# Patient Record
Sex: FEMALE | Race: WHITE | NOT HISPANIC OR LATINO | Employment: FULL TIME | URBAN - METROPOLITAN AREA
[De-identification: names, ages, dates, MRNs, and addresses within clinical notes are randomized per-mention and may not be internally consistent; named-entity substitution may affect disease eponyms.]

---

## 2018-09-21 ENCOUNTER — TELEPHONE (OUTPATIENT)
Dept: URGENT CARE | Facility: CLINIC | Age: 18
End: 2018-09-21

## 2018-09-21 NOTE — TELEPHONE ENCOUNTER
Sent Fax to [facility], They faxed back an Appt for 10/22/18 @ 9 a.m. Scheduled by Birgit. Pt was called and given the Info. Took this referral out of the Cue

## 2018-10-26 ENCOUNTER — HOSPITAL ENCOUNTER (OUTPATIENT)
Dept: GENERAL RADIOLOGY | Facility: HOSPITAL | Age: 18
Discharge: HOME | End: 2018-10-26
Admitting: NURSE PRACTITIONER

## 2018-10-26 ENCOUNTER — TRANSCRIBE ORDERS (OUTPATIENT)
Dept: ADMINISTRATIVE | Facility: HOSPITAL | Age: 18
End: 2018-10-26

## 2018-10-26 DIAGNOSIS — R52 PAIN: ICD-10-CM

## 2018-10-26 DIAGNOSIS — M54.2 CERVICALGIA: Primary | ICD-10-CM

## 2018-10-26 PROCEDURE — 73030 X-RAY EXAM OF SHOULDER: CPT

## 2018-10-26 PROCEDURE — 72050 X-RAY EXAM NECK SPINE 4/5VWS: CPT

## 2019-01-16 ENCOUNTER — TRANSCRIBE ORDERS (OUTPATIENT)
Dept: ADMINISTRATIVE | Facility: HOSPITAL | Age: 19
End: 2019-01-16

## 2019-01-16 DIAGNOSIS — R51.9 SEVERE HEADACHE: Primary | ICD-10-CM

## 2019-01-16 DIAGNOSIS — M54.2 CERVICALGIA: Primary | ICD-10-CM

## 2019-01-17 ENCOUNTER — TRANSCRIBE ORDERS (OUTPATIENT)
Dept: ADMINISTRATIVE | Facility: HOSPITAL | Age: 19
End: 2019-01-17

## 2019-01-17 DIAGNOSIS — G89.29 CHRONIC LEFT SHOULDER PAIN: Primary | ICD-10-CM

## 2019-01-17 DIAGNOSIS — M25.512 CHRONIC LEFT SHOULDER PAIN: Primary | ICD-10-CM

## 2019-01-22 ENCOUNTER — HOSPITAL ENCOUNTER (OUTPATIENT)
Dept: MRI IMAGING | Facility: HOSPITAL | Age: 19
Discharge: HOME | End: 2019-01-22
Admitting: NURSE PRACTITIONER

## 2019-01-22 ENCOUNTER — HOSPITAL ENCOUNTER (OUTPATIENT)
Dept: MRI IMAGING | Facility: HOSPITAL | Age: 19
Discharge: HOME | End: 2019-01-22

## 2019-01-22 DIAGNOSIS — R51.9 SEVERE HEADACHE: ICD-10-CM

## 2019-01-22 DIAGNOSIS — M54.2 CERVICALGIA: ICD-10-CM

## 2019-01-22 PROCEDURE — 72141 MRI NECK SPINE W/O DYE: CPT

## 2019-01-22 PROCEDURE — 70553 MRI BRAIN STEM W/O & W/DYE: CPT

## 2019-01-22 PROCEDURE — 0 GADOBENATE DIMEGLUMINE 529 MG/ML SOLUTION: Performed by: NURSE PRACTITIONER

## 2019-01-22 PROCEDURE — A9577 INJ MULTIHANCE: HCPCS | Performed by: NURSE PRACTITIONER

## 2019-01-22 RX ADMIN — GADOBENATE DIMEGLUMINE 13 ML: 529 INJECTION, SOLUTION INTRAVENOUS at 14:04

## 2019-01-23 ENCOUNTER — HOSPITAL ENCOUNTER (OUTPATIENT)
Dept: MRI IMAGING | Facility: HOSPITAL | Age: 19
Discharge: HOME | End: 2019-01-23

## 2019-01-23 ENCOUNTER — HOSPITAL ENCOUNTER (OUTPATIENT)
Dept: GENERAL RADIOLOGY | Facility: HOSPITAL | Age: 19
Discharge: HOME | End: 2019-01-23
Admitting: NURSE PRACTITIONER

## 2019-01-23 DIAGNOSIS — G89.29 CHRONIC LEFT SHOULDER PAIN: ICD-10-CM

## 2019-01-23 DIAGNOSIS — M25.512 CHRONIC LEFT SHOULDER PAIN: ICD-10-CM

## 2019-01-23 PROCEDURE — 73222 MRI JOINT UPR EXTREM W/DYE: CPT

## 2019-01-23 PROCEDURE — 25010000002 IOPAMIDOL 61 % SOLUTION: Performed by: NURSE PRACTITIONER

## 2019-01-23 PROCEDURE — 0 GADOBENATE DIMEGLUMINE 529 MG/ML SOLUTION: Performed by: NURSE PRACTITIONER

## 2019-01-23 PROCEDURE — A9577 INJ MULTIHANCE: HCPCS | Performed by: NURSE PRACTITIONER

## 2019-01-23 PROCEDURE — 77002 NEEDLE LOCALIZATION BY XRAY: CPT

## 2019-01-23 RX ORDER — LIDOCAINE HYDROCHLORIDE 10 MG/ML
5 INJECTION, SOLUTION INFILTRATION; PERINEURAL ONCE
Status: COMPLETED | OUTPATIENT
Start: 2019-01-23 | End: 2019-01-23

## 2019-01-23 RX ADMIN — IOPAMIDOL 20 ML: 612 INJECTION, SOLUTION INTRAVENOUS at 14:56

## 2019-01-23 RX ADMIN — GADOBENATE DIMEGLUMINE: 529 INJECTION, SOLUTION INTRAVENOUS at 14:54

## 2019-01-23 RX ADMIN — LIDOCAINE HYDROCHLORIDE 5 ML: 10 INJECTION, SOLUTION INFILTRATION; PERINEURAL at 14:56

## 2021-04-08 ENCOUNTER — TELEPHONE (OUTPATIENT)
Dept: URGENT CARE | Facility: CLINIC | Age: 21
End: 2021-04-08

## 2021-06-22 ENCOUNTER — LAB (OUTPATIENT)
Dept: LAB | Facility: HOSPITAL | Age: 21
End: 2021-06-22

## 2021-06-22 ENCOUNTER — TRANSCRIBE ORDERS (OUTPATIENT)
Dept: LAB | Facility: HOSPITAL | Age: 21
End: 2021-06-22

## 2021-06-22 DIAGNOSIS — Z34.81 PRENATAL CARE, SUBSEQUENT PREGNANCY, FIRST TRIMESTER: ICD-10-CM

## 2021-06-22 DIAGNOSIS — Z3A.08 8 WEEKS GESTATION OF PREGNANCY: Primary | ICD-10-CM

## 2021-06-22 DIAGNOSIS — Z3A.08 8 WEEKS GESTATION OF PREGNANCY: ICD-10-CM

## 2021-06-22 LAB
ABO GROUP BLD: NORMAL
AMPHET+METHAMPHET UR QL: NEGATIVE
AMPHETAMINES UR QL: NEGATIVE
BARBITURATES UR QL SCN: NEGATIVE
BASOPHILS # BLD AUTO: 0.01 10*3/MM3 (ref 0–0.2)
BASOPHILS NFR BLD AUTO: 0.1 % (ref 0–1.5)
BENZODIAZ UR QL SCN: NEGATIVE
BLD GP AB SCN SERPL QL: NEGATIVE
BUPRENORPHINE SERPL-MCNC: NEGATIVE NG/ML
CANNABINOIDS SERPL QL: NEGATIVE
COCAINE UR QL: NEGATIVE
DEPRECATED RDW RBC AUTO: 47.8 FL (ref 37–54)
EOSINOPHIL # BLD AUTO: 0.06 10*3/MM3 (ref 0–0.4)
EOSINOPHIL NFR BLD AUTO: 0.5 % (ref 0.3–6.2)
ERYTHROCYTE [DISTWIDTH] IN BLOOD BY AUTOMATED COUNT: 15.5 % (ref 12.3–15.4)
GLUCOSE SERPL-MCNC: 81 MG/DL (ref 65–99)
HCT VFR BLD AUTO: 34.6 % (ref 34–46.6)
HGB BLD-MCNC: 11.7 G/DL (ref 12–15.9)
IMM GRANULOCYTES # BLD AUTO: 0.03 10*3/MM3 (ref 0–0.05)
IMM GRANULOCYTES NFR BLD AUTO: 0.3 % (ref 0–0.5)
LYMPHOCYTES # BLD AUTO: 2.89 10*3/MM3 (ref 0.7–3.1)
LYMPHOCYTES NFR BLD AUTO: 25 % (ref 19.6–45.3)
MCH RBC QN AUTO: 28.5 PG (ref 26.6–33)
MCHC RBC AUTO-ENTMCNC: 33.8 G/DL (ref 31.5–35.7)
MCV RBC AUTO: 84.2 FL (ref 79–97)
METHADONE UR QL SCN: NEGATIVE
MONOCYTES # BLD AUTO: 0.81 10*3/MM3 (ref 0.1–0.9)
MONOCYTES NFR BLD AUTO: 7 % (ref 5–12)
NEUTROPHILS NFR BLD AUTO: 67.1 % (ref 42.7–76)
NEUTROPHILS NFR BLD AUTO: 7.77 10*3/MM3 (ref 1.7–7)
NRBC BLD AUTO-RTO: 0 /100 WBC (ref 0–0.2)
OPIATES UR QL: NEGATIVE
OXYCODONE UR QL SCN: NEGATIVE
PCP UR QL SCN: NEGATIVE
PLATELET # BLD AUTO: 352 10*3/MM3 (ref 140–450)
PMV BLD AUTO: 11.2 FL (ref 6–12)
PROPOXYPH UR QL: NEGATIVE
RBC # BLD AUTO: 4.11 10*6/MM3 (ref 3.77–5.28)
RH BLD: POSITIVE
TRICYCLICS UR QL SCN: NEGATIVE
TSH SERPL DL<=0.05 MIU/L-ACNC: 0.74 UIU/ML (ref 0.27–4.2)
WBC # BLD AUTO: 11.57 10*3/MM3 (ref 3.4–10.8)

## 2021-06-22 PROCEDURE — 81003 URINALYSIS AUTO W/O SCOPE: CPT

## 2021-06-22 PROCEDURE — 84443 ASSAY THYROID STIM HORMONE: CPT

## 2021-06-22 PROCEDURE — 80081 OBSTETRIC PANEL INC HIV TSTG: CPT

## 2021-06-22 PROCEDURE — 82947 ASSAY GLUCOSE BLOOD QUANT: CPT

## 2021-06-22 PROCEDURE — 80306 DRUG TEST PRSMV INSTRMNT: CPT

## 2021-06-22 PROCEDURE — 86762 RUBELLA ANTIBODY: CPT

## 2021-06-22 PROCEDURE — 86901 BLOOD TYPING SEROLOGIC RH(D): CPT

## 2021-06-22 PROCEDURE — 36415 COLL VENOUS BLD VENIPUNCTURE: CPT

## 2021-06-22 PROCEDURE — 86900 BLOOD TYPING SEROLOGIC ABO: CPT

## 2021-06-22 PROCEDURE — 86803 HEPATITIS C AB TEST: CPT

## 2021-06-22 PROCEDURE — 86850 RBC ANTIBODY SCREEN: CPT

## 2021-06-23 LAB
BILIRUB UR QL STRIP: NEGATIVE
CLARITY UR: CLEAR
COLOR UR: YELLOW
GLUCOSE UR STRIP-MCNC: NEGATIVE MG/DL
HBV SURFACE AG SERPL QL IA: NORMAL
HCV AB SER DONR QL: NORMAL
HGB UR QL STRIP.AUTO: NEGATIVE
HIV1+2 AB SER QL: NORMAL
KETONES UR QL STRIP: NEGATIVE
LEUKOCYTE ESTERASE UR QL STRIP.AUTO: NEGATIVE
NITRITE UR QL STRIP: NEGATIVE
PH UR STRIP.AUTO: 8 [PH] (ref 5–8)
PROT UR QL STRIP: NEGATIVE
RPR SER QL: NORMAL
RUBV IGG SERPL IA-ACNC: 1.56 INDEX
SP GR UR STRIP: 1.01 (ref 1–1.03)
UROBILINOGEN UR QL STRIP: NORMAL

## 2021-10-13 ENCOUNTER — TRANSCRIBE ORDERS (OUTPATIENT)
Dept: LAB | Facility: HOSPITAL | Age: 21
End: 2021-10-13

## 2021-10-13 ENCOUNTER — LAB (OUTPATIENT)
Dept: LAB | Facility: HOSPITAL | Age: 21
End: 2021-10-13

## 2021-10-13 DIAGNOSIS — Z3A.28 28 WEEKS GESTATION OF PREGNANCY: ICD-10-CM

## 2021-10-13 DIAGNOSIS — Z34.83 PRENATAL CARE, SUBSEQUENT PREGNANCY, THIRD TRIMESTER: ICD-10-CM

## 2021-10-13 DIAGNOSIS — Z3A.28 28 WEEKS GESTATION OF PREGNANCY: Primary | ICD-10-CM

## 2021-10-13 LAB
BLD GP AB SCN SERPL QL: NEGATIVE
DEPRECATED RDW RBC AUTO: 39.7 FL (ref 37–54)
ERYTHROCYTE [DISTWIDTH] IN BLOOD BY AUTOMATED COUNT: 14.1 % (ref 12.3–15.4)
GLUCOSE 1H P 100 G GLC PO SERPL-MCNC: 173 MG/DL (ref 65–139)
HCT VFR BLD AUTO: 25.4 % (ref 34–46.6)
HGB BLD-MCNC: 8.4 G/DL (ref 12–15.9)
MCH RBC QN AUTO: 25.9 PG (ref 26.6–33)
MCHC RBC AUTO-ENTMCNC: 33.1 G/DL (ref 31.5–35.7)
MCV RBC AUTO: 78.4 FL (ref 79–97)
PLATELET # BLD AUTO: 274 10*3/MM3 (ref 140–450)
PMV BLD AUTO: 11.4 FL (ref 6–12)
RBC # BLD AUTO: 3.24 10*6/MM3 (ref 3.77–5.28)
WBC # BLD AUTO: 11.9 10*3/MM3 (ref 3.4–10.8)

## 2021-10-13 PROCEDURE — 85027 COMPLETE CBC AUTOMATED: CPT

## 2021-10-13 PROCEDURE — 86850 RBC ANTIBODY SCREEN: CPT

## 2021-10-13 PROCEDURE — 82950 GLUCOSE TEST: CPT

## 2021-10-13 PROCEDURE — 82962 GLUCOSE BLOOD TEST: CPT | Performed by: NURSE PRACTITIONER

## 2021-10-13 PROCEDURE — 36415 COLL VENOUS BLD VENIPUNCTURE: CPT

## 2021-10-21 ENCOUNTER — LAB (OUTPATIENT)
Dept: LAB | Facility: HOSPITAL | Age: 21
End: 2021-10-21

## 2021-10-21 ENCOUNTER — TRANSCRIBE ORDERS (OUTPATIENT)
Dept: LAB | Facility: HOSPITAL | Age: 21
End: 2021-10-21

## 2021-10-21 DIAGNOSIS — O99.810 ABNORMAL MATERNAL GLUCOSE TOLERANCE, ANTEPARTUM: Primary | ICD-10-CM

## 2021-10-21 LAB
GLUCOSE 1H P 100 G GLC PO SERPL-MCNC: 183 MG/DL (ref 74–180)
GLUCOSE 2H P 100 G GLC PO SERPL-MCNC: 186 MG/DL (ref 74–155)
GLUCOSE 3H P 100 G GLC PO SERPL-MCNC: 134 MG/DL (ref 74–140)
GLUCOSE P FAST SERPL-MCNC: 83 MG/DL (ref 74–106)

## 2021-10-21 PROCEDURE — 82951 GLUCOSE TOLERANCE TEST (GTT): CPT

## 2021-10-21 PROCEDURE — 82962 GLUCOSE BLOOD TEST: CPT | Performed by: NURSE PRACTITIONER

## 2021-10-21 PROCEDURE — 82952 GTT-ADDED SAMPLES: CPT

## 2021-10-21 PROCEDURE — 36415 COLL VENOUS BLD VENIPUNCTURE: CPT

## 2021-10-28 ENCOUNTER — TRANSCRIBE ORDERS (OUTPATIENT)
Dept: OBSTETRICS AND GYNECOLOGY | Facility: HOSPITAL | Age: 21
End: 2021-10-28

## 2021-10-28 DIAGNOSIS — O24.419 GESTATIONAL DIABETES MELLITUS (GDM) IN THIRD TRIMESTER, GESTATIONAL DIABETES METHOD OF CONTROL UNSPECIFIED: ICD-10-CM

## 2021-10-28 DIAGNOSIS — O30.043 TWIN PREGNANCY, DICHORIONIC/DIAMNIOTIC, THIRD TRIMESTER: Primary | ICD-10-CM

## 2021-11-10 ENCOUNTER — APPOINTMENT (OUTPATIENT)
Dept: WOMENS IMAGING | Facility: HOSPITAL | Age: 21
End: 2021-11-10

## 2021-11-11 ENCOUNTER — OFFICE VISIT (OUTPATIENT)
Dept: OBSTETRICS AND GYNECOLOGY | Facility: HOSPITAL | Age: 21
End: 2021-11-11

## 2021-11-11 ENCOUNTER — HOSPITAL ENCOUNTER (OUTPATIENT)
Dept: WOMENS IMAGING | Facility: HOSPITAL | Age: 21
Discharge: HOME | End: 2021-11-11
Admitting: NURSE PRACTITIONER

## 2021-11-11 VITALS — BODY MASS INDEX: 35.19 KG/M2 | WEIGHT: 205 LBS | DIASTOLIC BLOOD PRESSURE: 77 MMHG | SYSTOLIC BLOOD PRESSURE: 137 MMHG

## 2021-11-11 DIAGNOSIS — Z34.90 PREGNANCY, UNSPECIFIED GESTATIONAL AGE: ICD-10-CM

## 2021-11-11 DIAGNOSIS — O30.043 TWIN PREGNANCY, DICHORIONIC/DIAMNIOTIC, THIRD TRIMESTER: ICD-10-CM

## 2021-11-11 DIAGNOSIS — O24.419 GESTATIONAL DIABETES MELLITUS (GDM) IN THIRD TRIMESTER, GESTATIONAL DIABETES METHOD OF CONTROL UNSPECIFIED: ICD-10-CM

## 2021-11-11 DIAGNOSIS — O30.043 DICHORIONIC DIAMNIOTIC TWIN PREGNANCY IN THIRD TRIMESTER: Primary | ICD-10-CM

## 2021-11-11 PROCEDURE — 76812 OB US DETAILED ADDL FETUS: CPT | Performed by: OBSTETRICS & GYNECOLOGY

## 2021-11-11 PROCEDURE — 76812 OB US DETAILED ADDL FETUS: CPT

## 2021-11-11 PROCEDURE — 76811 OB US DETAILED SNGL FETUS: CPT

## 2021-11-11 PROCEDURE — 76811 OB US DETAILED SNGL FETUS: CPT | Performed by: OBSTETRICS & GYNECOLOGY

## 2021-11-11 RX ORDER — FERROUS SULFATE 325(65) MG
325 TABLET ORAL
COMMUNITY
End: 2021-12-05 | Stop reason: HOSPADM

## 2021-11-11 RX ORDER — PRENATAL WITH FERROUS FUM AND FOLIC ACID 3080; 920; 120; 400; 22; 1.84; 3; 20; 10; 1; 12; 200; 27; 25; 2 [IU]/1; [IU]/1; MG/1; [IU]/1; MG/1; MG/1; MG/1; MG/1; MG/1; MG/1; UG/1; MG/1; MG/1; MG/1; MG/1
1 TABLET ORAL DAILY
COMMUNITY
End: 2021-12-05 | Stop reason: HOSPADM

## 2021-11-11 NOTE — PROGRESS NOTES
Denies any complaints.  No genetic screenings done.  Fasting blood sugars 70-80, 2 hours after meals 110-130.  Reports blood sugars to [facility].

## 2021-11-15 ENCOUNTER — TRANSCRIBE ORDERS (OUTPATIENT)
Dept: LAB | Facility: HOSPITAL | Age: 21
End: 2021-11-15

## 2021-11-15 ENCOUNTER — LAB (OUTPATIENT)
Dept: LAB | Facility: HOSPITAL | Age: 21
End: 2021-11-15

## 2021-11-15 DIAGNOSIS — Z34.83 PRENATAL CARE, SUBSEQUENT PREGNANCY, THIRD TRIMESTER: ICD-10-CM

## 2021-11-15 DIAGNOSIS — Z3A.33 33 WEEKS GESTATION OF PREGNANCY: Primary | ICD-10-CM

## 2021-11-15 DIAGNOSIS — Z3A.33 33 WEEKS GESTATION OF PREGNANCY: ICD-10-CM

## 2021-11-15 LAB
ALP SERPL-CCNC: 211 U/L (ref 39–117)
ALT SERPL W P-5'-P-CCNC: 11 U/L (ref 1–33)
AST SERPL-CCNC: 18 U/L (ref 1–32)
BILIRUB SERPL-MCNC: <0.2 MG/DL (ref 0–1.2)
CREAT SERPL-MCNC: 0.67 MG/DL (ref 0.57–1)
DEPRECATED RDW RBC AUTO: 44.3 FL (ref 37–54)
ERYTHROCYTE [DISTWIDTH] IN BLOOD BY AUTOMATED COUNT: 16.1 % (ref 12.3–15.4)
HCT VFR BLD AUTO: 27 % (ref 34–46.6)
HGB BLD-MCNC: 8.6 G/DL (ref 12–15.9)
LDH SERPL-CCNC: 183 U/L (ref 135–214)
MCH RBC QN AUTO: 24.7 PG (ref 26.6–33)
MCHC RBC AUTO-ENTMCNC: 31.9 G/DL (ref 31.5–35.7)
MCV RBC AUTO: 77.6 FL (ref 79–97)
PLATELET # BLD AUTO: 182 10*3/MM3 (ref 140–450)
PMV BLD AUTO: 12.6 FL (ref 6–12)
RBC # BLD AUTO: 3.48 10*6/MM3 (ref 3.77–5.28)
URATE SERPL-MCNC: 5.9 MG/DL (ref 2.4–5.7)
WBC # BLD AUTO: 9.68 10*3/MM3 (ref 3.4–10.8)

## 2021-11-15 PROCEDURE — 82570 ASSAY OF URINE CREATININE: CPT

## 2021-11-15 PROCEDURE — 84450 TRANSFERASE (AST) (SGOT): CPT

## 2021-11-15 PROCEDURE — 82247 BILIRUBIN TOTAL: CPT

## 2021-11-15 PROCEDURE — 85027 COMPLETE CBC AUTOMATED: CPT

## 2021-11-15 PROCEDURE — 83615 LACTATE (LD) (LDH) ENZYME: CPT

## 2021-11-15 PROCEDURE — 84550 ASSAY OF BLOOD/URIC ACID: CPT

## 2021-11-15 PROCEDURE — 84460 ALANINE AMINO (ALT) (SGPT): CPT

## 2021-11-15 PROCEDURE — 82565 ASSAY OF CREATININE: CPT

## 2021-11-15 PROCEDURE — 36415 COLL VENOUS BLD VENIPUNCTURE: CPT

## 2021-11-15 PROCEDURE — 84075 ASSAY ALKALINE PHOSPHATASE: CPT

## 2021-11-16 LAB — CREAT UR-MCNC: 67.8 MG/DL

## 2021-12-02 ENCOUNTER — ANESTHESIA (OUTPATIENT)
Dept: LABOR AND DELIVERY | Facility: HOSPITAL | Age: 21
End: 2021-12-02

## 2021-12-02 ENCOUNTER — ANESTHESIA EVENT (OUTPATIENT)
Dept: LABOR AND DELIVERY | Facility: HOSPITAL | Age: 21
End: 2021-12-02

## 2021-12-02 ENCOUNTER — HOSPITAL ENCOUNTER (INPATIENT)
Facility: HOSPITAL | Age: 21
LOS: 3 days | Discharge: HOME | End: 2021-12-05
Attending: OBSTETRICS & GYNECOLOGY | Admitting: OBSTETRICS & GYNECOLOGY

## 2021-12-02 PROBLEM — O30.049 DICHORIONIC DIAMNIOTIC TWIN PREGNANCY: Status: ACTIVE | Noted: 2021-12-02

## 2021-12-02 PROBLEM — O30.049 TWIN PREGNANCY, TWINS DICHORIONIC AND DIAMNIOTIC: Status: ACTIVE | Noted: 2021-12-02

## 2021-12-02 LAB
ABO GROUP BLD: NORMAL
ABO GROUP BLD: NORMAL
ALP SERPL-CCNC: 228 U/L (ref 39–117)
ALT SERPL W P-5'-P-CCNC: 9 U/L (ref 1–33)
AST SERPL-CCNC: 25 U/L (ref 1–32)
BILIRUB SERPL-MCNC: <0.2 MG/DL (ref 0–1.2)
BILIRUB UR QL STRIP: NEGATIVE
BLD GP AB SCN SERPL QL: NEGATIVE
CLARITY UR: CLEAR
COLOR UR: YELLOW
CREAT SERPL-MCNC: 0.76 MG/DL (ref 0.57–1)
CREAT UR-MCNC: 28.4 MG/DL
DEPRECATED RDW RBC AUTO: 46.1 FL (ref 37–54)
ERYTHROCYTE [DISTWIDTH] IN BLOOD BY AUTOMATED COUNT: 17.2 % (ref 12.3–15.4)
GLUCOSE UR STRIP-MCNC: NEGATIVE MG/DL
HCT VFR BLD AUTO: 26.7 % (ref 34–46.6)
HGB BLD-MCNC: 8.5 G/DL (ref 12–15.9)
HGB UR QL STRIP.AUTO: NEGATIVE
KETONES UR QL STRIP: NEGATIVE
LDH SERPL-CCNC: 248 U/L (ref 135–214)
LEUKOCYTE ESTERASE UR QL STRIP.AUTO: NEGATIVE
MCH RBC QN AUTO: 23.8 PG (ref 26.6–33)
MCHC RBC AUTO-ENTMCNC: 31.8 G/DL (ref 31.5–35.7)
MCV RBC AUTO: 74.8 FL (ref 79–97)
NITRITE UR QL STRIP: NEGATIVE
PH UR STRIP.AUTO: 7 [PH] (ref 5–8)
PLATELET # BLD AUTO: 200 10*3/MM3 (ref 140–450)
PMV BLD AUTO: 12.1 FL (ref 6–12)
PROT ?TM UR-MCNC: 9.5 MG/DL
PROT UR QL STRIP: NEGATIVE
RBC # BLD AUTO: 3.57 10*6/MM3 (ref 3.77–5.28)
RH BLD: POSITIVE
RH BLD: POSITIVE
SP GR UR STRIP: 1.01 (ref 1–1.03)
T&S EXPIRATION DATE: NORMAL
URATE SERPL-MCNC: 7.4 MG/DL (ref 2.4–5.7)
UROBILINOGEN UR QL STRIP: NORMAL
WBC NRBC COR # BLD: 9.5 10*3/MM3 (ref 3.4–10.8)

## 2021-12-02 PROCEDURE — 25010000002 ONDANSETRON PER 1 MG: Performed by: ANESTHESIOLOGY

## 2021-12-02 PROCEDURE — 87086 URINE CULTURE/COLONY COUNT: CPT | Performed by: OBSTETRICS & GYNECOLOGY

## 2021-12-02 PROCEDURE — 81003 URINALYSIS AUTO W/O SCOPE: CPT | Performed by: OBSTETRICS & GYNECOLOGY

## 2021-12-02 PROCEDURE — 84156 ASSAY OF PROTEIN URINE: CPT | Performed by: OBSTETRICS & GYNECOLOGY

## 2021-12-02 PROCEDURE — 84460 ALANINE AMINO (ALT) (SGPT): CPT | Performed by: OBSTETRICS & GYNECOLOGY

## 2021-12-02 PROCEDURE — 59025 FETAL NON-STRESS TEST: CPT

## 2021-12-02 PROCEDURE — 25010000002 MORPHINE PER 10 MG: Performed by: ANESTHESIOLOGY

## 2021-12-02 PROCEDURE — 84075 ASSAY ALKALINE PHOSPHATASE: CPT | Performed by: OBSTETRICS & GYNECOLOGY

## 2021-12-02 PROCEDURE — 86901 BLOOD TYPING SEROLOGIC RH(D): CPT | Performed by: OBSTETRICS & GYNECOLOGY

## 2021-12-02 PROCEDURE — 25010000002 KETOROLAC TROMETHAMINE PER 15 MG: Performed by: OBSTETRICS & GYNECOLOGY

## 2021-12-02 PROCEDURE — 86900 BLOOD TYPING SEROLOGIC ABO: CPT

## 2021-12-02 PROCEDURE — 86850 RBC ANTIBODY SCREEN: CPT | Performed by: OBSTETRICS & GYNECOLOGY

## 2021-12-02 PROCEDURE — 84450 TRANSFERASE (AST) (SGOT): CPT | Performed by: OBSTETRICS & GYNECOLOGY

## 2021-12-02 PROCEDURE — 86923 COMPATIBILITY TEST ELECTRIC: CPT

## 2021-12-02 PROCEDURE — 25010000002 TERBUTALINE PER 1 MG

## 2021-12-02 PROCEDURE — 25010000002 NALBUPHINE PER 10 MG: Performed by: ANESTHESIOLOGY

## 2021-12-02 PROCEDURE — 0 CEFAZOLIN IN DEXTROSE 2-4 GM/100ML-% SOLUTION: Performed by: OBSTETRICS & GYNECOLOGY

## 2021-12-02 PROCEDURE — 25010000002 ONDANSETRON PER 1 MG: Performed by: OBSTETRICS & GYNECOLOGY

## 2021-12-02 PROCEDURE — 82247 BILIRUBIN TOTAL: CPT | Performed by: OBSTETRICS & GYNECOLOGY

## 2021-12-02 PROCEDURE — 25010000002 FENTANYL CITRATE (PF) 50 MCG/ML SOLUTION: Performed by: ANESTHESIOLOGY

## 2021-12-02 PROCEDURE — 86901 BLOOD TYPING SEROLOGIC RH(D): CPT

## 2021-12-02 PROCEDURE — 85027 COMPLETE CBC AUTOMATED: CPT | Performed by: OBSTETRICS & GYNECOLOGY

## 2021-12-02 PROCEDURE — 82570 ASSAY OF URINE CREATININE: CPT | Performed by: OBSTETRICS & GYNECOLOGY

## 2021-12-02 PROCEDURE — 83615 LACTATE (LD) (LDH) ENZYME: CPT | Performed by: OBSTETRICS & GYNECOLOGY

## 2021-12-02 PROCEDURE — 82565 ASSAY OF CREATININE: CPT | Performed by: OBSTETRICS & GYNECOLOGY

## 2021-12-02 PROCEDURE — 84550 ASSAY OF BLOOD/URIC ACID: CPT | Performed by: OBSTETRICS & GYNECOLOGY

## 2021-12-02 PROCEDURE — 25010000002 METOCLOPRAMIDE PER 10 MG: Performed by: ANESTHESIOLOGY

## 2021-12-02 PROCEDURE — 86900 BLOOD TYPING SEROLOGIC ABO: CPT | Performed by: OBSTETRICS & GYNECOLOGY

## 2021-12-02 RX ORDER — ACETAMINOPHEN 500 MG
1000 TABLET ORAL ONCE
Status: COMPLETED | OUTPATIENT
Start: 2021-12-02 | End: 2021-12-02

## 2021-12-02 RX ORDER — ONDANSETRON 2 MG/ML
4 INJECTION INTRAMUSCULAR; INTRAVENOUS EVERY 6 HOURS PRN
Status: DISCONTINUED | OUTPATIENT
Start: 2021-12-02 | End: 2021-12-02 | Stop reason: HOSPADM

## 2021-12-02 RX ORDER — BUPIVACAINE HYDROCHLORIDE 7.5 MG/ML
INJECTION, SOLUTION INTRASPINAL AS NEEDED
Status: DISCONTINUED | OUTPATIENT
Start: 2021-12-02 | End: 2021-12-02 | Stop reason: SURG

## 2021-12-02 RX ORDER — ONDANSETRON 4 MG/1
4 TABLET, FILM COATED ORAL EVERY 6 HOURS PRN
Status: DISCONTINUED | OUTPATIENT
Start: 2021-12-02 | End: 2021-12-02 | Stop reason: HOSPADM

## 2021-12-02 RX ORDER — SODIUM CHLORIDE 0.9 % (FLUSH) 0.9 %
10 SYRINGE (ML) INJECTION AS NEEDED
Status: DISCONTINUED | OUTPATIENT
Start: 2021-12-02 | End: 2021-12-02 | Stop reason: HOSPADM

## 2021-12-02 RX ORDER — OXYTOCIN-SODIUM CHLORIDE 0.9% IV SOLN 30 UNIT/500ML 30-0.9/5 UT/ML-%
650 SOLUTION INTRAVENOUS ONCE
Status: DISCONTINUED | OUTPATIENT
Start: 2021-12-02 | End: 2021-12-02 | Stop reason: HOSPADM

## 2021-12-02 RX ORDER — MORPHINE SULFATE 1 MG/ML
INJECTION, SOLUTION EPIDURAL; INTRATHECAL; INTRAVENOUS AS NEEDED
Status: DISCONTINUED | OUTPATIENT
Start: 2021-12-02 | End: 2021-12-02 | Stop reason: SURG

## 2021-12-02 RX ORDER — ONDANSETRON 2 MG/ML
INJECTION INTRAMUSCULAR; INTRAVENOUS AS NEEDED
Status: DISCONTINUED | OUTPATIENT
Start: 2021-12-02 | End: 2021-12-02 | Stop reason: SURG

## 2021-12-02 RX ORDER — METOCLOPRAMIDE HYDROCHLORIDE 5 MG/ML
INJECTION INTRAMUSCULAR; INTRAVENOUS AS NEEDED
Status: DISCONTINUED | OUTPATIENT
Start: 2021-12-02 | End: 2021-12-02 | Stop reason: SURG

## 2021-12-02 RX ORDER — SODIUM CHLORIDE, SODIUM LACTATE, POTASSIUM CHLORIDE, CALCIUM CHLORIDE 600; 310; 30; 20 MG/100ML; MG/100ML; MG/100ML; MG/100ML
125 INJECTION, SOLUTION INTRAVENOUS CONTINUOUS
Status: DISCONTINUED | OUTPATIENT
Start: 2021-12-02 | End: 2021-12-03

## 2021-12-02 RX ORDER — TRISODIUM CITRATE DIHYDRATE AND CITRIC ACID MONOHYDRATE 500; 334 MG/5ML; MG/5ML
30 SOLUTION ORAL ONCE
Status: COMPLETED | OUTPATIENT
Start: 2021-12-02 | End: 2021-12-02

## 2021-12-02 RX ORDER — KETOROLAC TROMETHAMINE 30 MG/ML
30 INJECTION, SOLUTION INTRAMUSCULAR; INTRAVENOUS ONCE
Status: COMPLETED | OUTPATIENT
Start: 2021-12-02 | End: 2021-12-02

## 2021-12-02 RX ORDER — TERBUTALINE SULFATE 1 MG/ML
INJECTION, SOLUTION SUBCUTANEOUS
Status: COMPLETED
Start: 2021-12-02 | End: 2021-12-02

## 2021-12-02 RX ORDER — TERBUTALINE SULFATE 1 MG/ML
0.25 INJECTION, SOLUTION SUBCUTANEOUS ONCE
Status: COMPLETED | OUTPATIENT
Start: 2021-12-02 | End: 2021-12-02

## 2021-12-02 RX ORDER — OXYTOCIN 10 [USP'U]/ML
INJECTION, SOLUTION INTRAMUSCULAR; INTRAVENOUS AS NEEDED
Status: DISCONTINUED | OUTPATIENT
Start: 2021-12-02 | End: 2021-12-02 | Stop reason: SURG

## 2021-12-02 RX ORDER — SODIUM CHLORIDE 0.9 % (FLUSH) 0.9 %
10 SYRINGE (ML) INJECTION EVERY 12 HOURS SCHEDULED
Status: DISCONTINUED | OUTPATIENT
Start: 2021-12-02 | End: 2021-12-02 | Stop reason: HOSPADM

## 2021-12-02 RX ORDER — FAMOTIDINE 10 MG/ML
INJECTION, SOLUTION INTRAVENOUS AS NEEDED
Status: DISCONTINUED | OUTPATIENT
Start: 2021-12-02 | End: 2021-12-02 | Stop reason: SURG

## 2021-12-02 RX ORDER — SODIUM CHLORIDE, SODIUM LACTATE, POTASSIUM CHLORIDE, CALCIUM CHLORIDE 600; 310; 30; 20 MG/100ML; MG/100ML; MG/100ML; MG/100ML
INJECTION, SOLUTION INTRAVENOUS CONTINUOUS PRN
Status: DISCONTINUED | OUTPATIENT
Start: 2021-12-02 | End: 2021-12-02 | Stop reason: SURG

## 2021-12-02 RX ORDER — LIDOCAINE HYDROCHLORIDE 10 MG/ML
5 INJECTION, SOLUTION EPIDURAL; INFILTRATION; INTRACAUDAL; PERINEURAL AS NEEDED
Status: DISCONTINUED | OUTPATIENT
Start: 2021-12-02 | End: 2021-12-02 | Stop reason: HOSPADM

## 2021-12-02 RX ORDER — OXYTOCIN-SODIUM CHLORIDE 0.9% IV SOLN 30 UNIT/500ML 30-0.9/5 UT/ML-%
85 SOLUTION INTRAVENOUS ONCE
Status: COMPLETED | OUTPATIENT
Start: 2021-12-02 | End: 2021-12-02

## 2021-12-02 RX ORDER — FENTANYL CITRATE 50 UG/ML
INJECTION, SOLUTION INTRAMUSCULAR; INTRAVENOUS AS NEEDED
Status: DISCONTINUED | OUTPATIENT
Start: 2021-12-02 | End: 2021-12-02 | Stop reason: SURG

## 2021-12-02 RX ORDER — CEFAZOLIN SODIUM 2 G/100ML
2 INJECTION, SOLUTION INTRAVENOUS ONCE
Status: COMPLETED | OUTPATIENT
Start: 2021-12-02 | End: 2021-12-02

## 2021-12-02 RX ORDER — SODIUM CHLORIDE, SODIUM LACTATE, POTASSIUM CHLORIDE, CALCIUM CHLORIDE 600; 310; 30; 20 MG/100ML; MG/100ML; MG/100ML; MG/100ML
125 INJECTION, SOLUTION INTRAVENOUS CONTINUOUS
Status: DISCONTINUED | OUTPATIENT
Start: 2021-12-02 | End: 2021-12-02 | Stop reason: SDUPTHER

## 2021-12-02 RX ORDER — OXYTOCIN-SODIUM CHLORIDE 0.9% IV SOLN 30 UNIT/500ML 30-0.9/5 UT/ML-%
SOLUTION INTRAVENOUS AS NEEDED
Status: DISCONTINUED | OUTPATIENT
Start: 2021-12-02 | End: 2021-12-02 | Stop reason: SURG

## 2021-12-02 RX ADMIN — ONDANSETRON 4 MG: 2 INJECTION INTRAMUSCULAR; INTRAVENOUS at 22:53

## 2021-12-02 RX ADMIN — SODIUM CHLORIDE, POTASSIUM CHLORIDE, SODIUM LACTATE AND CALCIUM CHLORIDE 1000 ML: 600; 310; 30; 20 INJECTION, SOLUTION INTRAVENOUS at 17:40

## 2021-12-02 RX ADMIN — OXYTOCIN 3 UNITS: 10 INJECTION, SOLUTION INTRAMUSCULAR; INTRAVENOUS at 21:40

## 2021-12-02 RX ADMIN — TERBUTALINE SULFATE 0.25 MG: 1 INJECTION, SOLUTION SUBCUTANEOUS at 18:08

## 2021-12-02 RX ADMIN — MORPHINE SULFATE 0.15 MG: 1 INJECTION, SOLUTION EPIDURAL; INTRATHECAL; INTRAVENOUS at 21:22

## 2021-12-02 RX ADMIN — OXYTOCIN-SODIUM CHLORIDE 0.9% IV SOLN 30 UNIT/500ML 500 ML: 30-0.9/5 SOLUTION at 21:40

## 2021-12-02 RX ADMIN — BUPIVACAINE HYDROCHLORIDE IN DEXTROSE 1.6 ML: 7.5 INJECTION, SOLUTION SUBARACHNOID at 21:22

## 2021-12-02 RX ADMIN — FAMOTIDINE 20 MG: 10 INJECTION, SOLUTION INTRAVENOUS at 21:15

## 2021-12-02 RX ADMIN — Medication 85 ML/HR: at 22:00

## 2021-12-02 RX ADMIN — SODIUM CITRATE AND CITRIC ACID MONOHYDRATE 30 ML: 500; 334 SOLUTION ORAL at 20:56

## 2021-12-02 RX ADMIN — METOCLOPRAMIDE 10 MG: 5 INJECTION, SOLUTION INTRAMUSCULAR; INTRAVENOUS at 21:15

## 2021-12-02 RX ADMIN — ACETAMINOPHEN 1000 MG: 500 TABLET, FILM COATED ORAL at 20:56

## 2021-12-02 RX ADMIN — FENTANYL CITRATE 20 MCG: 50 INJECTION, SOLUTION INTRAMUSCULAR; INTRAVENOUS at 21:22

## 2021-12-02 RX ADMIN — NALBUPHINE HYDROCHLORIDE 3 MG: 10 INJECTION, SOLUTION INTRAMUSCULAR; INTRAVENOUS; SUBCUTANEOUS at 22:45

## 2021-12-02 RX ADMIN — ONDANSETRON 4 MG: 2 INJECTION INTRAMUSCULAR; INTRAVENOUS at 21:15

## 2021-12-02 RX ADMIN — OXYTOCIN 10 UNITS: 10 INJECTION, SOLUTION INTRAMUSCULAR; INTRAVENOUS at 21:42

## 2021-12-02 RX ADMIN — OXYTOCIN 3 UNITS: 10 INJECTION, SOLUTION INTRAMUSCULAR; INTRAVENOUS at 21:45

## 2021-12-02 RX ADMIN — CEFAZOLIN SODIUM 2 G: 2 INJECTION, SOLUTION INTRAVENOUS at 20:57

## 2021-12-02 RX ADMIN — OXYTOCIN 4 UNITS: 10 INJECTION, SOLUTION INTRAMUSCULAR; INTRAVENOUS at 21:37

## 2021-12-02 RX ADMIN — SODIUM CHLORIDE, POTASSIUM CHLORIDE, SODIUM LACTATE AND CALCIUM CHLORIDE: 600; 310; 30; 20 INJECTION, SOLUTION INTRAVENOUS at 21:13

## 2021-12-02 RX ADMIN — KETOROLAC TROMETHAMINE 30 MG: 30 INJECTION, SOLUTION INTRAMUSCULAR at 22:53

## 2021-12-02 NOTE — H&P
[place]  Obstetric History and Physical    Chief Complaint   Patient presents with   • Elevated Blood Pressure       Subjective     Patient is a 21 y.o. female  currently at 35w4d, who presented to the OB office today with complains of HA, edema, nausea, and general malaise.  Her symptoms have been present for one day.  BPs in the office were noted to be elevated at 140/92.      Her prenatal care is complicated by Di/Di twin gestation, gestational diabetes, and anemia. She is scheduled for  at 37 weeks.  Her previous obstetric/gynecological history is noted for is non-contributory.    The following portions of the patients history were reviewed and updated as appropriate: current medications, allergies, past medical history, past surgical history, past family history, past social history and problem list .       Prenatal Information:  Prenatal Results     POC Urine Glucose/Protein     Test Value Reference Range Date Time    Urine Glucose        Urine Protein              Initial Prenatal Labs     Test Value Reference Range Date Time    Hemoglobin  8.6 g/dL 12.0 - 15.9 11/15/21 1620       8.4 g/dL 12.0 - 15.9 10/13/21 1032       11.7 g/dL 12.0 - 15.9 21 1510    Hematocrit  27.0 % 34.0 - 46.6 11/15/21 1620       25.4 % 34.0 - 46.6 10/13/21 1032       34.6 % 34.0 - 46.6 21 1510    Platelets  182 10*3/mm3 140 - 450 11/15/21 1620       274 10*3/mm3 140 - 450 10/13/21 1032       352 10*3/mm3 140 - 450 21 1510    Rubella IgG  1.56 index Immune >0.99 21 1510    Hepatitis B SAg  Non-Reactive  Non-Reactive 21 1510    Hepatitis C Ab  Non-Reactive  Non-Reactive 21 1510    RPR  Non-Reactive  Non-Reactive 21 1510    ABO  O   21 1510    Rh  Positive   21 1510    Antibody Screen  Negative   10/13/21 1032       Negative   21 1510    HIV  Non-Reactive  Non-Reactive 21 1510    Urine Culture        Gonorrhea        Chlamydia        TSH  0.738 uIU/mL  0.270 - 4.200 06/22/21 1510    HgB A1c               2nd and 3rd Trimester     Test Value Reference Range Date Time    Hemoglobin (repeated)  8.6 g/dL 12.0 - 15.9 11/15/21 1620       8.4 g/dL 12.0 - 15.9 10/13/21 1032    Hematocrit (repeated)  27.0 % 34.0 - 46.6 11/15/21 1620       25.4 % 34.0 - 46.6 10/13/21 1032    Platelets   182 10*3/mm3 140 - 450 11/15/21 1620       274 10*3/mm3 140 - 450 10/13/21 1032       352 10*3/mm3 140 - 450 06/22/21 1510    GCT  183 mg/dL 74 - 180 10/21/21 1518       173 mg/dL 65 - 139 10/13/21 1032    Antibody Screen (repeated)  Negative   10/13/21 1032    GTT Fasting        GTT 1 Hr        GTT 2 Hr        GTT 3 Hr  134 mg/dL 74 - 140 10/21/21 1711    Group B Strep              Drug Screening     Test Value Reference Range Date Time    Amphetamine Screen  Negative  Negative 06/22/21 1510    Barbiturate Screen  Negative  Negative 06/22/21 1510    Benzodiazepine Screen  Negative  Negative 06/22/21 1510    Methadone Screen  Negative  Negative 06/22/21 1510    Phencyclidine Screen  Negative  Negative 06/22/21 1510    Opiates Screen  Negative  Negative 06/22/21 1510    THC Screen  Negative  Negative 06/22/21 1510    Cocaine Screen  Negative  Negative 06/22/21 1510    Propoxyphene Screen  Negative  Negative 06/22/21 1510    Buprenorphine Screen  Negative  Negative 06/22/21 1510    Methamphetamine Screen  Negative  Negative 06/22/21 1510    Oxycodone Screen  Negative  Negative 06/22/21 1510    Tricyclic Antidepressants Screen  Negative  Negative 06/22/21 1510          Other (Risk screening)     Test Value Reference Range Date Time    Varicella IgG        Parvovirus IgG        CMV IgG        Cystic Fibrosis        Hemoglobin electrophoresis        NIPT        MSAFP-4        AFP (for NTD only)              Legend    ^: Historical                      External Prenatal Results     Pregnancy Outside Results - Transcribed From Office Records - See Scanned Records For Details     Test Value Date  Time    ABO  O  21 1510    Rh  Positive  21 1510    Antibody Screen  Negative  10/13/21 1032       Negative  21 1510    Varicella IgG       Rubella  1.56 index 21 1510    Hgb  8.6 g/dL 11/15/21 1620       8.4 g/dL 10/13/21 1032       11.7 g/dL 21 1510    Hct  27.0 % 11/15/21 1620       25.4 % 10/13/21 1032       34.6 % 21 1510    Glucose Fasting GTT       Glucose Tolerance Test 1 hour       Glucose Tolerance Test 3 hour  134 mg/dL 10/21/21 1711    Gonorrhea (discrete)       Chlamydia (discrete)       RPR  Non-Reactive  21 1510    VDRL       Syphilis Antibody       HBsAg  Non-Reactive  21 1510    Herpes Simplex Virus PCR       Herpes Simplex VIrus Culture       HIV  Non-Reactive  21 1510    Hep C RNA Quant PCR       Hep C Antibody  Non-Reactive  21 1510    AFP       Group B Strep       GBS Susceptibility to Clindamycin       GBS Susceptibility to Erythromycin       Fetal Fibronectin       Genetic Testing, Maternal Blood             Drug Screening     Test Value Date Time    Urine Drug Screen       Amphetamine Screen  Negative  21 1510    Barbiturate Screen  Negative  21 1510    Benzodiazepine Screen  Negative  21 1510    Methadone Screen  Negative  21 1510    Phencyclidine Screen  Negative  21 1510    Opiates Screen  Negative  21 1510    THC Screen  Negative  21 1510    Cocaine Screen       Propoxyphene Screen  Negative  21 1510    Buprenorphine Screen  Negative  21 1510    Methamphetamine Screen       Oxycodone Screen  Negative  21 1510    Tricyclic Antidepressants Screen  Negative  21 1510          Legend    ^: Historical                         Past OB History:     OB History    Para Term  AB Living   1 0 0 0 0 0   SAB IAB Ectopic Molar Multiple Live Births   0 0 0 0 0 0      # Outcome Date GA Lbr Isaiah/2nd Weight Sex Delivery Anes PTL Lv   1 Current                Past Medical  History: Past Medical History:   Diagnosis Date   • ADHD    • Anxiety    • Asthma    • Depression    • Diabetes mellitus (HCC) 2021    gestational   • Migraine    • Personal history of COVID-19 09/2021      Past Surgical History Past Surgical History:   Procedure Laterality Date   • TONSILLECTOMY     • WISDOM TOOTH EXTRACTION        Family History: No family history on file.   Social History:  reports that she has never smoked. She has never used smokeless tobacco.   reports previous alcohol use.   reports no history of drug use.        Review of Systems   Constitutional: Negative.    Eyes: Negative.    Respiratory: Negative.    Cardiovascular: Negative.    Gastrointestinal: Negative.    Endocrine: Negative.    Genitourinary: Negative.    Musculoskeletal: Negative.    Skin: Negative.    Neurological: Positive for headaches.   Hematological: Negative.    Psychiatric/Behavioral: Negative.          Objective     Vital Signs Range for the last 24 hours  Temperature:     Temp Source:     BP: BP: (144)/(90) 144/90   Pulse: Heart Rate:  [98] 98   Respirations:     SPO2:     O2 Amount (l/min):     O2 Devices     Weight:       Physical Examination: General appearance - alert, well appearing, and in no distress  Neck - supple, no significant adenopathy  Chest - clear to auscultation, no wheezes, rales or rhonchi, symmetric air entry  Heart - normal rate, regular rhythm, normal S1, S2, no murmurs, rubs, clicks or gallops  Abdomen - soft, nontender, nondistended, no masses or organomegaly  Extremities - pedal edema 3 +    Presentation:    Cervix: Exam by:     Dilation:     Effacement:     Station:       Fetal Heart Rate Assessment   Method:     Beats/min:     Baseline:     Variability:     Accels:     Decels:     Tracing Category:       Uterine Assessment   Method:     Frequency (min):     Ctx Count in 10 min:     Duration:     Intensity:     Intensity by IUPC:     Resting Tone:     Resting Tone by IUPC:     [place] Units:          Assessment/Plan         Assessment & Plan    Assessment:  1.  Intrauterine pregnancy at 35w4d gestation with reactive fetal status.    2.  Elevated blood pressure with headache  3. Di/Di Twin gestation  4. Gestational diabetes  5. Edema   6. Anemia     Plan:  1. Admit to APU for observation, labs, serial BPs  2. Plan of care has been reviewed with patient and she verbalizes understanding  3.  Risks, benefits of treatment plan have been discussed.  4.  All questions have been answered.        Shira Ricci CNM  12/2/2021  14:30 EST

## 2021-12-03 LAB
BASOPHILS # BLD AUTO: 0.03 10*3/MM3 (ref 0–0.2)
BASOPHILS NFR BLD AUTO: 0.2 % (ref 0–1.5)
DEPRECATED RDW RBC AUTO: 49 FL (ref 37–54)
EOSINOPHIL # BLD AUTO: 0.02 10*3/MM3 (ref 0–0.4)
EOSINOPHIL NFR BLD AUTO: 0.1 % (ref 0.3–6.2)
ERYTHROCYTE [DISTWIDTH] IN BLOOD BY AUTOMATED COUNT: 17.7 % (ref 12.3–15.4)
HCT VFR BLD AUTO: 26 % (ref 34–46.6)
HGB BLD-MCNC: 8.2 G/DL (ref 12–15.9)
IMM GRANULOCYTES # BLD AUTO: 0.11 10*3/MM3 (ref 0–0.05)
IMM GRANULOCYTES NFR BLD AUTO: 0.8 % (ref 0–0.5)
LYMPHOCYTES # BLD AUTO: 2.95 10*3/MM3 (ref 0.7–3.1)
LYMPHOCYTES NFR BLD AUTO: 21.4 % (ref 19.6–45.3)
MCH RBC QN AUTO: 24.3 PG (ref 26.6–33)
MCHC RBC AUTO-ENTMCNC: 31.5 G/DL (ref 31.5–35.7)
MCV RBC AUTO: 77.2 FL (ref 79–97)
MONOCYTES # BLD AUTO: 1.07 10*3/MM3 (ref 0.1–0.9)
MONOCYTES NFR BLD AUTO: 7.8 % (ref 5–12)
NEUTROPHILS NFR BLD AUTO: 69.7 % (ref 42.7–76)
NEUTROPHILS NFR BLD AUTO: 9.59 10*3/MM3 (ref 1.7–7)
NRBC BLD AUTO-RTO: 0.1 /100 WBC (ref 0–0.2)
PLATELET # BLD AUTO: 175 10*3/MM3 (ref 140–450)
PMV BLD AUTO: 12.2 FL (ref 6–12)
RBC # BLD AUTO: 3.37 10*6/MM3 (ref 3.77–5.28)
WBC NRBC COR # BLD: 13.77 10*3/MM3 (ref 3.4–10.8)

## 2021-12-03 PROCEDURE — 63710000001 ONDANSETRON PER 8 MG: Performed by: OBSTETRICS & GYNECOLOGY

## 2021-12-03 PROCEDURE — 85025 COMPLETE CBC W/AUTO DIFF WBC: CPT | Performed by: NURSE PRACTITIONER

## 2021-12-03 PROCEDURE — 25010000002 KETOROLAC TROMETHAMINE PER 15 MG: Performed by: OBSTETRICS & GYNECOLOGY

## 2021-12-03 PROCEDURE — 25010000002 PROMETHAZINE PER 50 MG: Performed by: OBSTETRICS & GYNECOLOGY

## 2021-12-03 RX ORDER — DOCUSATE SODIUM 100 MG/1
100 CAPSULE, LIQUID FILLED ORAL 2 TIMES DAILY
Status: DISCONTINUED | OUTPATIENT
Start: 2021-12-03 | End: 2021-12-05 | Stop reason: HOSPADM

## 2021-12-03 RX ORDER — PROMETHAZINE HYDROCHLORIDE 12.5 MG/1
12.5 SUPPOSITORY RECTAL EVERY 6 HOURS PRN
Status: DISCONTINUED | OUTPATIENT
Start: 2021-12-03 | End: 2021-12-05 | Stop reason: HOSPADM

## 2021-12-03 RX ORDER — FERROUS SULFATE 325(65) MG
325 TABLET ORAL 2 TIMES DAILY WITH MEALS
Status: DISCONTINUED | OUTPATIENT
Start: 2021-12-03 | End: 2021-12-05 | Stop reason: HOSPADM

## 2021-12-03 RX ORDER — SIMETHICONE 80 MG
80 TABLET,CHEWABLE ORAL 4 TIMES DAILY PRN
Status: DISCONTINUED | OUTPATIENT
Start: 2021-12-03 | End: 2021-12-03

## 2021-12-03 RX ORDER — DOCUSATE SODIUM 100 MG/1
100 CAPSULE, LIQUID FILLED ORAL 2 TIMES DAILY PRN
Status: DISCONTINUED | OUTPATIENT
Start: 2021-12-03 | End: 2021-12-03

## 2021-12-03 RX ORDER — HYDROMORPHONE HYDROCHLORIDE 1 MG/ML
0.5 INJECTION, SOLUTION INTRAMUSCULAR; INTRAVENOUS; SUBCUTANEOUS
Status: ACTIVE | OUTPATIENT
Start: 2021-12-03 | End: 2021-12-04

## 2021-12-03 RX ORDER — HYDROCORTISONE 25 MG/G
1 CREAM TOPICAL AS NEEDED
Status: DISCONTINUED | OUTPATIENT
Start: 2021-12-03 | End: 2021-12-05 | Stop reason: HOSPADM

## 2021-12-03 RX ORDER — SIMETHICONE 80 MG
80 TABLET,CHEWABLE ORAL
Status: DISCONTINUED | OUTPATIENT
Start: 2021-12-03 | End: 2021-12-05 | Stop reason: HOSPADM

## 2021-12-03 RX ORDER — CALCIUM CARBONATE 200(500)MG
1 TABLET,CHEWABLE ORAL EVERY 4 HOURS PRN
Status: DISCONTINUED | OUTPATIENT
Start: 2021-12-03 | End: 2021-12-05 | Stop reason: HOSPADM

## 2021-12-03 RX ORDER — LANOLIN
CREAM (ML) TOPICAL
Status: DISCONTINUED | OUTPATIENT
Start: 2021-12-03 | End: 2021-12-05 | Stop reason: HOSPADM

## 2021-12-03 RX ORDER — NALBUPHINE HCL 10 MG/ML
AMPUL (ML) INJECTION AS NEEDED
Status: DISCONTINUED | OUTPATIENT
Start: 2021-12-02 | End: 2021-12-03 | Stop reason: SURG

## 2021-12-03 RX ORDER — PRENATAL VIT/IRON FUM/FOLIC AC 27MG-0.8MG
1 TABLET ORAL DAILY
Status: DISCONTINUED | OUTPATIENT
Start: 2021-12-03 | End: 2021-12-05 | Stop reason: HOSPADM

## 2021-12-03 RX ORDER — KETOROLAC TROMETHAMINE 30 MG/ML
15 INJECTION, SOLUTION INTRAMUSCULAR; INTRAVENOUS EVERY 6 HOURS
Status: COMPLETED | OUTPATIENT
Start: 2021-12-03 | End: 2021-12-03

## 2021-12-03 RX ORDER — SODIUM CHLORIDE 0.9 % (FLUSH) 0.9 %
3-10 SYRINGE (ML) INJECTION AS NEEDED
Status: DISCONTINUED | OUTPATIENT
Start: 2021-12-03 | End: 2021-12-05 | Stop reason: HOSPADM

## 2021-12-03 RX ORDER — PROMETHAZINE HYDROCHLORIDE 25 MG/1
25 TABLET ORAL EVERY 6 HOURS PRN
Status: DISCONTINUED | OUTPATIENT
Start: 2021-12-03 | End: 2021-12-05 | Stop reason: HOSPADM

## 2021-12-03 RX ORDER — ACETAMINOPHEN 325 MG/1
650 TABLET ORAL EVERY 6 HOURS
Status: DISCONTINUED | OUTPATIENT
Start: 2021-12-04 | End: 2021-12-05 | Stop reason: HOSPADM

## 2021-12-03 RX ORDER — NALBUPHINE HCL 10 MG/ML
3 AMPUL (ML) INJECTION EVERY 4 HOURS PRN
Status: ACTIVE | OUTPATIENT
Start: 2021-12-03 | End: 2021-12-04

## 2021-12-03 RX ORDER — OXYCODONE HYDROCHLORIDE 5 MG/1
5 TABLET ORAL EVERY 4 HOURS PRN
Status: DISCONTINUED | OUTPATIENT
Start: 2021-12-03 | End: 2021-12-05 | Stop reason: HOSPADM

## 2021-12-03 RX ORDER — OXYCODONE HYDROCHLORIDE 5 MG/1
10 TABLET ORAL EVERY 4 HOURS PRN
Status: DISCONTINUED | OUTPATIENT
Start: 2021-12-03 | End: 2021-12-05 | Stop reason: HOSPADM

## 2021-12-03 RX ORDER — IBUPROFEN 600 MG/1
600 TABLET ORAL EVERY 6 HOURS
Status: DISCONTINUED | OUTPATIENT
Start: 2021-12-04 | End: 2021-12-05 | Stop reason: HOSPADM

## 2021-12-03 RX ORDER — NALBUPHINE HCL 10 MG/ML
5 AMPUL (ML) INJECTION EVERY 4 HOURS PRN
Status: ACTIVE | OUTPATIENT
Start: 2021-12-03 | End: 2021-12-04

## 2021-12-03 RX ORDER — SODIUM CHLORIDE 0.9 % (FLUSH) 0.9 %
3 SYRINGE (ML) INJECTION EVERY 12 HOURS SCHEDULED
Status: DISCONTINUED | OUTPATIENT
Start: 2021-12-03 | End: 2021-12-05 | Stop reason: HOSPADM

## 2021-12-03 RX ORDER — ALUMINA, MAGNESIA, AND SIMETHICONE 2400; 2400; 240 MG/30ML; MG/30ML; MG/30ML
15 SUSPENSION ORAL EVERY 4 HOURS PRN
Status: DISCONTINUED | OUTPATIENT
Start: 2021-12-03 | End: 2021-12-05 | Stop reason: HOSPADM

## 2021-12-03 RX ORDER — ACETAMINOPHEN 500 MG
1000 TABLET ORAL EVERY 6 HOURS
Status: DISPENSED | OUTPATIENT
Start: 2021-12-03 | End: 2021-12-04

## 2021-12-03 RX ORDER — ONDANSETRON 4 MG/1
4 TABLET, FILM COATED ORAL EVERY 8 HOURS PRN
Status: DISCONTINUED | OUTPATIENT
Start: 2021-12-03 | End: 2021-12-05 | Stop reason: HOSPADM

## 2021-12-03 RX ADMIN — ACETAMINOPHEN 1000 MG: 500 TABLET, FILM COATED ORAL at 14:28

## 2021-12-03 RX ADMIN — KETOROLAC TROMETHAMINE 15 MG: 30 INJECTION, SOLUTION INTRAMUSCULAR at 23:19

## 2021-12-03 RX ADMIN — DOCUSATE SODIUM 100 MG: 100 CAPSULE, LIQUID FILLED ORAL at 21:51

## 2021-12-03 RX ADMIN — OXYCODONE 10 MG: 5 TABLET ORAL at 14:39

## 2021-12-03 RX ADMIN — SODIUM CHLORIDE, POTASSIUM CHLORIDE, SODIUM LACTATE AND CALCIUM CHLORIDE 1000 ML: 600; 310; 30; 20 INJECTION, SOLUTION INTRAVENOUS at 09:00

## 2021-12-03 RX ADMIN — KETOROLAC TROMETHAMINE 15 MG: 30 INJECTION, SOLUTION INTRAMUSCULAR at 11:34

## 2021-12-03 RX ADMIN — SIMETHICONE 80 MG: 80 TABLET, CHEWABLE ORAL at 21:51

## 2021-12-03 RX ADMIN — FERROUS SULFATE TAB 325 MG (65 MG ELEMENTAL FE) 325 MG: 325 (65 FE) TAB at 21:51

## 2021-12-03 RX ADMIN — OXYCODONE 5 MG: 5 TABLET ORAL at 23:19

## 2021-12-03 RX ADMIN — KETOROLAC TROMETHAMINE 15 MG: 30 INJECTION, SOLUTION INTRAMUSCULAR at 17:25

## 2021-12-03 RX ADMIN — KETOROLAC TROMETHAMINE 15 MG: 30 INJECTION, SOLUTION INTRAMUSCULAR at 06:39

## 2021-12-03 RX ADMIN — PROMETHAZINE HYDROCHLORIDE 12.5 MG: 25 INJECTION INTRAMUSCULAR; INTRAVENOUS at 01:45

## 2021-12-03 RX ADMIN — ACETAMINOPHEN 1000 MG: 500 TABLET, FILM COATED ORAL at 21:51

## 2021-12-03 RX ADMIN — ONDANSETRON HYDROCHLORIDE 4 MG: 4 TABLET, FILM COATED ORAL at 08:46

## 2021-12-03 RX ADMIN — OXYCODONE 10 MG: 5 TABLET ORAL at 18:33

## 2021-12-03 RX ADMIN — ACETAMINOPHEN 1000 MG: 500 TABLET, FILM COATED ORAL at 02:28

## 2021-12-03 RX ADMIN — OXYCODONE 5 MG: 5 TABLET ORAL at 02:28

## 2021-12-03 NOTE — OP NOTE
Section Procedure Note    Shavonne Pimentel    2021     Pre-operative Diagnosis: 1. IUP at 35w4d   2. Di-di twin pregnancy    3.  labor    4. Gestational hypertension    5. Fetus A in breech presentation      Post-operative Diagnosis: same    Findings: normal anatomy.  Infant A in silvestre breech presentation.  Infant B in vertex presentation.  True knot in both umbilical cords.  Infant B with nuchal x 1.     Estimated Blood Loss:  500           Drains: [place]                 Specimens: None               Complications:  None; patient tolerated the procedure well.           Disposition: PACU - hemodynamically stable.           Condition: stable    Surgeon: Linda Leos MD     Assistants: Dr. Johnson  was responsible for performing the following activities: Retraction, Suction, Irrigation and Delivery of Fetus and their skilled assistance was necessary for the success of this case.    Anesthesia: Spinal anesthesia    ASA Class: 2    Procedure Details   The patient was seen in the Holding Room. The risks, benefits, complications, treatment options, and expected outcomes were discussed with the patient.  The patient concurred with the proposed plan, giving informed consent.  The site of surgery was properly noted. The patient was taken to the Operating Room, identified as Shavonne Pimentel and the procedure verified as  Delivery. A Time Out was held and the above information confirmed.    After induction of anesthesia, the patient was draped and prepped in the usual sterile manner. A Pfannenstiel incision was made and carried down through the subcutaneous tissue to the fascia. A fascial incision was made and extended transversely. The fascia was  from the underlying rectus tissue superiorly and inferiorly. The peritoneum was identified and entered. The peritoneal incision was extended longitudinally.  A bladder flap was turned down.  A low transverse uterine  incision was made. Delivered from the silvestre breech presentation using the standard breech extraction maneuvers was a female infant and delivered from the vertex presentation was a female infant with birth weights and apgars of      Lynn Pimentel [[id number]]   No birth weight on file.      Lynn Pimentel [[id number]]   2832 g (6 lb 3.9 oz)          Lynn Pimentel [[id number]]           APGARS  One minute Five minutes Ten minutes Fifteen minutes Twenty minutes   Skin color: 0   1             Heart rate: 2   2             Grimace: 2   2              Muscle tone: 2   2              Breathin   2              Totals: 8   9                   Lynn Pimentel [[id number]]           APGARS  One minute Five minutes Ten minutes Fifteen minutes Twenty minutes   Skin color: 0   1             Heart rate: 2   2             Grimace: 2   2              Muscle tone: 2   2              Breathin   2              Totals: 8   9              . After the umbilical cord was clamped and cut, cord blood was obtained for evaluation. The placenta was removed intact and appeared normal. The uterine outline, tubes and ovaries appeared normal. The uterine incision was closed with running locked sutures of 1 chromic. 10 units of pitocin injected IM into the cornua.  Hemostasis was observed.  The fascia was then reapproximated with running sutures of 0 vicryl. The subcutaneous tissue was reapproximated with 3-0 plain. The skin was reapproximated with insorb subcuticular staples and reinforced with skin glue.      Instrument, sponge, and needle counts were correct prior the abdominal closure and at the conclusion of the case.         Linda Leos MD  21:59 EST  2021

## 2021-12-03 NOTE — LACTATION NOTE
12/03/21 0750   Maternal Information   Date of Referral 12/03/21   Person Making Referral other (see comments)  (courtesy)   Maternal Reason for Referral multiple birth; no prior breastfeeding experience  (mom feeling sick and has not put babies to breast yet.  Initiated pumping and encouraged to pump after nursing when attempting.)   Maternal Assessment   Breast Size Issue none   Breast Shape Bilateral:; round   Breast Density Bilateral:; soft   Nipples Bilateral:; short   Left Nipple Symptoms intact   Right Nipple Symptoms intact   Maternal Infant Feeding   Maternal Emotional State relaxed; receptive   Milk Expression/Equipment   Breast Pump Type double electric, hospital grade  (lansinoh pump at home)   Breast Pump Flange Type hard   Breast Pump Flange Size 24 mm   Equipment for Home Use breast pump borrowed   Breast Pumping   Breast Pumping Interventions post-feed pumping encouraged; frequent pumping encouraged  (q 3 pumping when not nursing, and post nursing pumping when putting babies to breast)   Breast Pumping double electric breast pump utilized

## 2021-12-03 NOTE — LACTATION NOTE
12/03/21 1345   Maternal Information   Date of Referral 12/03/21   Person Making Referral patient; nurse   Maternal Reason for Referral breastfeeding currently; no prior breastfeeding experience; multiple birth  (Mom ready to nurse babies for the first time.)   Infant Reason for Referral 35-37 weeks gestation   Maternal Assessment   Breast Size Issue none   Breast Shape Bilateral:; round   Breast Density Bilateral:; soft   Nipples Bilateral:; short   Left Nipple Symptoms intact   Right Nipple Symptoms intact   Maternal Infant Feeding   Maternal Emotional State relaxed; receptive   Infant Positioning clutch/football  (got mom tandem with assistance from lactation and FOB)   Signs of Milk Transfer audible swallow; deep jaw excursions noted; other (see comments)  (both infants clicking at breast for entire feeding)   Pain with Feeding no   Comfort Measures Before/During Feeding infant position adjusted; latch adjusted; maternal position adjusted; suction broken using finger   Nipple Shape After Feeding, Left Breast round; symmetrical; appropriately projected   Nipple Shape After Feeding, Right round; symmetrical; appropriately projected   Latch Assistance full assistance needed   Support Person Involvement actively supporting mother; verbally supports mother   Milk Expression/Equipment   Breast Pump Type double electric, hospital grade   Breast Pumping   Breast Pumping Interventions post-feed pumping encouraged

## 2021-12-03 NOTE — PROGRESS NOTES
Patient has made cervical change since admission.  PCS for delivery due to malpresentation of Fetus A.  Surgical risks reviewed.  Ancef 2 g preop.  SCDs.

## 2021-12-03 NOTE — ANESTHESIA POSTPROCEDURE EVALUATION
Patient: Shavonne Pimentel    Procedure Summary     Date: 21 Room / Location: [facility] LABOR DELIVERY   [facility] LABOR DELIVERY    Anesthesia Start:  Anesthesia Stop:     Procedure:  SECTION PRIMARY DI-DI TWINS (N/A Abdomen) Diagnosis:     Surgeons: Linda Leos MD Provider: Demian Downs DO    Anesthesia Type: spinal, ITN ASA Status: 2 - Emergent          Anesthesia Type: spinal, ITN    Vitals  Vitals Value Taken Time   /90 21 0608   Temp 98.4 °F (36.9 °C) 21 0608   Pulse 94 21 0608   Resp 16 21 0608   SpO2 94 % 21 2321   Vitals shown include unvalidated device data.        Post Anesthesia Care and Evaluation    Patient location during evaluation: bedside  Patient participation: complete - patient participated  Level of consciousness: awake and awake and alert  Pain score: 0  Pain management: satisfactory to patient  Airway patency: patent  Anesthetic complications: No anesthetic complications  PONV Status: none  Cardiovascular status: acceptable, hemodynamically stable and stable  Respiratory status: acceptable  Hydration status: stable  Post Neuraxial Block status: Motor and sensory function returned to baseline and No signs or symptoms of PDPH

## 2021-12-03 NOTE — ANESTHESIA PROCEDURE NOTES
Spinal Block      Patient reassessed immediately prior to procedure    Patient location during procedure: OB  Performed By  Anesthesiologist: Demian Downs DO  Preanesthetic Checklist  Completed: patient identified, IV checked, site marked, risks and benefits discussed, surgical consent, monitors and equipment checked, pre-op evaluation and timeout performed  Spinal Block Prep:  Patient Position:sitting  Sterile Tech:cap, gloves, mask and sterile barriers  Prep:Chloraprep  Patient Monitoring:blood pressure monitoring, continuous pulse oximetry and EKG  Spinal Block Procedure  Approach:midline  Guidance:landmark technique and palpation technique  Location:L3-L4  Needle Type:Elo  Needle Gauge:25 G  Placement of Spinal needle event:cerebrospinal fluid aspirated  Paresthesia: no  Fluid Appearance:clear     Post Assessment  Patient Tolerance:patient tolerated the procedure well with no apparent complications  Complications no

## 2021-12-03 NOTE — PROGRESS NOTES
YANET Wren   PROGRESS NOTE      Subjective     Patient reports:   Doing well, pain controlled with medication, ambulating around the room, priscilla po. Asymptomatic from anemia.     Objective      Vitals: Vital Signs Range for the last 24 hours  Temperature: Temp:  [97.7 °F (36.5 °C)-98.5 °F (36.9 °C)] 98.4 °F (36.9 °C)   Temp Source: Temp src: Oral   BP: BP: (102-158)/(41-96) 127/90   Pulse: Heart Rate:  [] 94   Respirations: Resp:  [16-20] 16   SPO2: SpO2:  [94 %-98 %] 96 %   O2 Amount (l/min):     O2 Devices Device (Oxygen Therapy): room air          Physical Exam    Lungs clear to auscultation bilaterally   Abdomen Soft, non-tender, normal bowel sounds; no bruits, organomegaly or masses.   Incision  healing well, no drainage, no erythema, no hernia, no seroma, no swelling, well approximated   Extremities extremities normal, atraumatic, no cyanosis or edema     LABS:  Lab Results   Component Value Date    WBC 9.50 2021    HGB 8.5 (L) 2021    HCT 26.7 (L) 2021    MCV 74.8 (L) 2021     2021       Assessment/Plan        Twin pregnancy, twins dichorionic and diamniotic    Dichorionic diamniotic twin pregnancy      Assessment:    Shavonne Pimentel is Day 1  post-partum    anemia    Plan:  remove urine catheter, saline lock IV fluids, advance diet  normal diet as tolerated and continue post op care.  Awaiting post operative CBC.  Continue PO iron      Shira Ricci CNM  12/3/2021  08:08 EST

## 2021-12-03 NOTE — ANESTHESIA PREPROCEDURE EVALUATION
Anesthesia Evaluation     Patient summary reviewed and Nursing notes reviewed   NPO Solid Status: > 6 hours  NPO Liquid Status: > 2 hours           Airway   Mallampati: II  TM distance: >3 FB  Neck ROM: full  No difficulty expected  Dental      Pulmonary    (+) asthma,  Cardiovascular - negative cardio ROS        Neuro/Psych  (+) headaches, psychiatric history Anxiety, Depression and ADHD,     GI/Hepatic/Renal/Endo    (+)   diabetes mellitus gestational well controlled,     Musculoskeletal (-) negative ROS    Abdominal    Substance History - negative use     OB/GYN    (+) Pregnant,     Comment: Di-Di Twin gestation.  Urgent  for ANTONIO with fetal  Malposition, fetus A in breech presentation.      Other                      Anesthesia Plan    ASA 2 - emergent     spinal and ITN       Anesthetic plan, all risks, benefits, and alternatives have been provided, discussed and informed consent has been obtained with: patient.  Use of blood products discussed with patient .

## 2021-12-03 NOTE — ANESTHESIA POSTPROCEDURE EVALUATION
Patient: Shavonne Pimentel    Procedure Summary     Date: 21 Room / Location: [facility] LABOR DELIVERY   [facility] LABOR DELIVERY    Anesthesia Start:  Anesthesia Stop:     Procedure:  SECTION PRIMARY DI-DI TWINS (N/A Abdomen) Diagnosis:     Surgeons: Linda Leos MD Provider: Demian Downs DO    Anesthesia Type: Not recorded ASA Status: Not recorded          Anesthesia Type: No value filed.    Vitals  Vitals Value Taken Time   /41 215   Temp 98.5 °F (36.9 °C) 21   Pulse 112 21   Resp 18 21   SpO2 97 % 21   Vitals shown include unvalidated device data.        Post Anesthesia Care and Evaluation    Patient location during evaluation: bedside  Patient participation: complete - patient participated  Level of consciousness: awake  Pain score: 0  Pain management: satisfactory to patient  Airway patency: patent  Anesthetic complications: No anesthetic complications  PONV Status: none  Cardiovascular status: acceptable and hemodynamically stable  Respiratory status: acceptable  Hydration status: acceptable

## 2021-12-03 NOTE — PLAN OF CARE
Goal Outcome Evaluation:  Plan of Care Reviewed With: patient, significant other        Progress: improving  Outcome Summary: Mom has been nauseated and not feeling well so babies were bottle fed in nsy all night.  Currently still nauseated.  Instructed mom on pumping with hospital pump q 3 hours and attempting at breast as desired/following with pumping.  Mom has lansinoh pump at home.  Breastfeeding and pumping education done, information given.

## 2021-12-04 LAB
BACTERIA SPEC AEROBE CULT: NO GROWTH
BASOPHILS # BLD AUTO: 0.03 10*3/MM3 (ref 0–0.2)
BASOPHILS NFR BLD AUTO: 0.2 % (ref 0–1.5)
DEPRECATED RDW RBC AUTO: 48.6 FL (ref 37–54)
EOSINOPHIL # BLD AUTO: 0.04 10*3/MM3 (ref 0–0.4)
EOSINOPHIL NFR BLD AUTO: 0.3 % (ref 0.3–6.2)
ERYTHROCYTE [DISTWIDTH] IN BLOOD BY AUTOMATED COUNT: 17.7 % (ref 12.3–15.4)
HCT VFR BLD AUTO: 25.2 % (ref 34–46.6)
HGB BLD-MCNC: 8.1 G/DL (ref 12–15.9)
IMM GRANULOCYTES # BLD AUTO: 0.3 10*3/MM3 (ref 0–0.05)
IMM GRANULOCYTES NFR BLD AUTO: 2.3 % (ref 0–0.5)
LYMPHOCYTES # BLD AUTO: 2.53 10*3/MM3 (ref 0.7–3.1)
LYMPHOCYTES NFR BLD AUTO: 19.1 % (ref 19.6–45.3)
MCH RBC QN AUTO: 24.4 PG (ref 26.6–33)
MCHC RBC AUTO-ENTMCNC: 32.1 G/DL (ref 31.5–35.7)
MCV RBC AUTO: 75.9 FL (ref 79–97)
MONOCYTES # BLD AUTO: 1.02 10*3/MM3 (ref 0.1–0.9)
MONOCYTES NFR BLD AUTO: 7.7 % (ref 5–12)
NEUTROPHILS NFR BLD AUTO: 70.4 % (ref 42.7–76)
NEUTROPHILS NFR BLD AUTO: 9.31 10*3/MM3 (ref 1.7–7)
NRBC BLD AUTO-RTO: 0.3 /100 WBC (ref 0–0.2)
PLATELET # BLD AUTO: 200 10*3/MM3 (ref 140–450)
PMV BLD AUTO: 11.8 FL (ref 6–12)
RBC # BLD AUTO: 3.32 10*6/MM3 (ref 3.77–5.28)
WBC NRBC COR # BLD: 13.23 10*3/MM3 (ref 3.4–10.8)

## 2021-12-04 PROCEDURE — 85025 COMPLETE CBC W/AUTO DIFF WBC: CPT | Performed by: OBSTETRICS & GYNECOLOGY

## 2021-12-04 RX ADMIN — DOCUSATE SODIUM 100 MG: 100 CAPSULE, LIQUID FILLED ORAL at 21:35

## 2021-12-04 RX ADMIN — OXYCODONE 5 MG: 5 TABLET ORAL at 21:35

## 2021-12-04 RX ADMIN — ACETAMINOPHEN 650 MG: 325 TABLET, FILM COATED ORAL at 08:55

## 2021-12-04 RX ADMIN — Medication 3 ML: at 08:56

## 2021-12-04 RX ADMIN — IBUPROFEN 600 MG: 600 TABLET ORAL at 06:37

## 2021-12-04 RX ADMIN — OXYCODONE 5 MG: 5 TABLET ORAL at 04:39

## 2021-12-04 RX ADMIN — SIMETHICONE 80 MG: 80 TABLET, CHEWABLE ORAL at 12:23

## 2021-12-04 RX ADMIN — SIMETHICONE 80 MG: 80 TABLET, CHEWABLE ORAL at 21:35

## 2021-12-04 RX ADMIN — FERROUS SULFATE TAB 325 MG (65 MG ELEMENTAL FE) 325 MG: 325 (65 FE) TAB at 18:29

## 2021-12-04 RX ADMIN — FERROUS SULFATE TAB 325 MG (65 MG ELEMENTAL FE) 325 MG: 325 (65 FE) TAB at 08:56

## 2021-12-04 RX ADMIN — SERTRALINE HYDROCHLORIDE 50 MG: 50 TABLET ORAL at 12:23

## 2021-12-04 RX ADMIN — SIMETHICONE 80 MG: 80 TABLET, CHEWABLE ORAL at 08:56

## 2021-12-04 RX ADMIN — PRENATAL VITAMINS-IRON FUMARATE 27 MG IRON-FOLIC ACID 0.8 MG TABLET 1 TABLET: at 08:56

## 2021-12-04 RX ADMIN — ACETAMINOPHEN 650 MG: 325 TABLET, FILM COATED ORAL at 04:39

## 2021-12-04 RX ADMIN — IBUPROFEN 600 MG: 600 TABLET ORAL at 18:29

## 2021-12-04 RX ADMIN — ACETAMINOPHEN 650 MG: 325 TABLET, FILM COATED ORAL at 15:17

## 2021-12-04 RX ADMIN — SIMETHICONE 80 MG: 80 TABLET, CHEWABLE ORAL at 18:29

## 2021-12-04 RX ADMIN — IBUPROFEN 600 MG: 600 TABLET ORAL at 12:23

## 2021-12-04 RX ADMIN — DOCUSATE SODIUM 100 MG: 100 CAPSULE, LIQUID FILLED ORAL at 08:56

## 2021-12-04 RX ADMIN — ACETAMINOPHEN 650 MG: 325 TABLET, FILM COATED ORAL at 21:35

## 2021-12-04 NOTE — PROGRESS NOTES
YANET Wren   PROGRESS NOTE      Subjective     Patient reports:   Doing well, pain controlled with medication, ambulating around the room, priscilla po   Denies GONZALEZ, vision changes and epigastric pain.     Objective      Vitals: Vital Signs Range for the last 24 hours  Temperature: Temp:  [97.8 °F (36.6 °C)-98.6 °F (37 °C)] 98.6 °F (37 °C)   Temp Source: Temp src: Oral   BP: BP: (113-145)/(61-91) 122/61   Pulse: Heart Rate:  [76-98] 98   Respirations: Resp:  [16-18] 18   SPO2:     O2 Amount (l/min):     O2 Devices            Physical Exam    Lungs clear to auscultation bilaterally   Abdomen Soft, non-tender, normal bowel sounds; no bruits, organomegaly or masses.   Incision  healing well, no drainage, no erythema, no hernia, no seroma, no swelling, well approximated   Extremities edema 2+ pitting edema in feet and ankles, 1+ pretibial edema     LABS:  Lab Results   Component Value Date    WBC 13.77 (H) 2021    HGB 8.2 (L) 2021    HCT 26.0 (L) 2021    MCV 77.2 (L) 2021     2021       Assessment/Plan        Twin pregnancy, twins dichorionic and diamniotic    Dichorionic diamniotic twin pregnancy  Mildly elevated blood pressure    Assessment:    Shavonne Pimentel is Day 2  post-partum  Awaiting CBC, lab unable to collect this am      Plan:  continue post op care.        Shira Ricci CNM  2021  09:22 EST

## 2021-12-04 NOTE — LACTATION NOTE
Mom is continuing to breast feed, pump, and supplement twins with Neosure.  She appears to be exhausted.  She was advised that she could pump and bottle feed only to have a break, especially at night.  She was provided literature on breastfeeding twins.  She was encouraged to call for assistance, if needed.

## 2021-12-05 VITALS
DIASTOLIC BLOOD PRESSURE: 78 MMHG | RESPIRATION RATE: 16 BRPM | SYSTOLIC BLOOD PRESSURE: 131 MMHG | HEIGHT: 65 IN | OXYGEN SATURATION: 98 % | HEART RATE: 82 BPM | BODY MASS INDEX: 36.82 KG/M2 | WEIGHT: 221 LBS | TEMPERATURE: 98.5 F

## 2021-12-05 LAB — TROPONIN T SERPL-MCNC: <0.01 NG/ML (ref 0–0.03)

## 2021-12-05 PROCEDURE — 84484 ASSAY OF TROPONIN QUANT: CPT | Performed by: OBSTETRICS & GYNECOLOGY

## 2021-12-05 RX ORDER — HYDROCODONE BITARTRATE AND ACETAMINOPHEN 5; 325 MG/1; MG/1
2 TABLET ORAL EVERY 6 HOURS PRN
Qty: 20 TABLET | Refills: 0 | Status: SHIPPED | OUTPATIENT
Start: 2021-12-05

## 2021-12-05 RX ADMIN — IBUPROFEN 600 MG: 600 TABLET ORAL at 00:32

## 2021-12-05 RX ADMIN — OXYCODONE 5 MG: 5 TABLET ORAL at 09:27

## 2021-12-05 RX ADMIN — FERROUS SULFATE TAB 325 MG (65 MG ELEMENTAL FE) 325 MG: 325 (65 FE) TAB at 09:28

## 2021-12-05 RX ADMIN — OXYCODONE 5 MG: 5 TABLET ORAL at 03:39

## 2021-12-05 RX ADMIN — PRENATAL VITAMINS-IRON FUMARATE 27 MG IRON-FOLIC ACID 0.8 MG TABLET 1 TABLET: at 09:28

## 2021-12-05 RX ADMIN — IBUPROFEN 600 MG: 600 TABLET ORAL at 13:33

## 2021-12-05 RX ADMIN — ACETAMINOPHEN 650 MG: 325 TABLET, FILM COATED ORAL at 03:39

## 2021-12-05 RX ADMIN — SERTRALINE HYDROCHLORIDE 50 MG: 50 TABLET ORAL at 09:28

## 2021-12-05 RX ADMIN — IBUPROFEN 600 MG: 600 TABLET ORAL at 06:40

## 2021-12-05 RX ADMIN — DOCUSATE SODIUM 100 MG: 100 CAPSULE, LIQUID FILLED ORAL at 09:27

## 2021-12-05 RX ADMIN — SIMETHICONE 80 MG: 80 TABLET, CHEWABLE ORAL at 13:33

## 2021-12-05 RX ADMIN — ACETAMINOPHEN 650 MG: 325 TABLET, FILM COATED ORAL at 09:28

## 2021-12-05 RX ADMIN — SIMETHICONE 80 MG: 80 TABLET, CHEWABLE ORAL at 09:27

## 2021-12-05 NOTE — DISCHARGE SUMMARY
[facility]   Discharge Summary      Patient: Shavonne Pimentel      MR#:[id number]  Admission  Diagnosis: Twin gestation, pregnancy at 35 weeks,  labor  Discharge Diagnosis:   1.  delivery delivered        Date of Admission: 2021  Date of Discharge:       Procedures:     Lynn Pimentel [[id number]]   , Low Transverse      Lynn Pimentel B [[id number]]   , Low Transverse          Lynn Pimentel [[id number]]   2021      Lynn Pimentel [[id number]]   2021         Lynn Pimentel [[id number]]   9:35 PM      Lynn Pimentel [[id number]]   9:36 PM        Service:  Obstetrics    Hospital Course:  Patient underwent  section for  labor with twin gestation.  She remained in the hospital for 3 days.  During that time she remained afebrile and hemodynamically stable.  On the day of discharge, she was eating, ambulating and voiding without difficulty.        Labs:    Lab Results (last 24 hours)     Procedure Component Value Units Date/Time    Troponin [[id number]]  (Normal) Collected: 21    Specimen: Blood Updated: 21     Troponin T <0.010 ng/mL     Narrative:      Troponin T Reference Range:  <= 0.03 ng/mL-   Negative for AMI  >0.03 ng/mL-     Abnormal for myocardial necrosis.  Clinicians would have to utilize clinical acumen, EKG, Troponin and serial changes to determine if it is an Acute Myocardial Infarction or myocardial injury due to an underlying chronic condition.       Results may be falsely decreased if patient taking Biotin.            Discharge Medications     Discharge Medications      New Medications      Instructions Start Date   HYDROcodone-acetaminophen 5-325 MG per tablet  Commonly known as: NORCO   2 tablets, Oral, Every 6 Hours PRN         Stop These Medications    albuterol sulfate  (90 Base) MCG/ACT inhaler  Commonly known  as: PROVENTIL HFA;VENTOLIN HFA;PROAIR HFA     azithromycin 250 MG tablet  Commonly known as: ZITHROMAX     brompheniramine-pseudoephedrine-DM 30-2-10 MG/5ML syrup     buPROPion  MG 24 hr tablet  Commonly known as: WELLBUTRIN XL     EPINEPHrine 0.3 MG/0.3ML solution auto-injector injection  Commonly known as: EPIPEN     ferrous sulfate 325 (65 FE) MG tablet     guanFACINE HCl ER 2 MG tablet sustained-release 24 hour     montelukast 10 MG tablet  Commonly known as: SINGULAIR     Prenatal 27-1 27-1 MG tablet tablet     SPRINTEC 28 PO            Discharge Disposition:  To Home    Discharge Condition:  Stable    Discharge Diet: regular    Activity at Discharge: pelvic rest    Follow-up Appointments  3 days for BP check      Shira Ricci CNM  12/05/21  12:31 EST

## 2021-12-05 NOTE — LACTATION NOTE
12/05/21 1000   Maternal Information   Person Making Referral other (see comments)  (Courtesy visit, 35.4 week twins)   Maternal Reason for Referral breastfeeding currently   Infant Reason for Referral 35-37 weeks gestation; other (see comments)  (Baby B rooting & crying)   Maternal Assessment   Breast Size Issue none   Breast Shape Bilateral:; pendulous   Breast Density Bilateral:; soft   Nipples Bilateral:; flat; graspable   Left Nipple Symptoms redness   Right Nipple Symptoms redness   Maternal Infant Feeding   Maternal Emotional State relaxed; receptive   Infant Positioning clutch/football   Signs of Milk Transfer audible swallow   Pain with Feeding no  (Denies pain)   Comfort Measures Before/During Feeding infant position adjusted; latch adjusted   Comfort Measures Following Feeding air-drying encouraged; breast cream/oil applied   Prefeeding Nipple Condition reddened   Postfeeding Nipple Condition reddened   Nipple Shape After Feeding, Left Breast symmetrical   Nipple Shape After Feeding, Right symmetrical   Latch Assistance minimal assistance   Support Person Involvement actively supporting mother   Milk Expression/Equipment   Breast Pump Type double electric, hospital grade   Breast Pumping   Breast Pumping Interventions post-feed pumping encouraged

## 2021-12-05 NOTE — PROGRESS NOTES
YANET Wren   PROGRESS NOTE      Subjective     Patient reports:   Doing well, pain controlled with medication, ambulating around the room, priscilla po.  Reported chest heaviness in the night, but now feeling better.      Objective      Vitals: Vital Signs Range for the last 24 hours  Temperature: Temp:  [98.5 °F (36.9 °C)-98.7 °F (37.1 °C)] 98.5 °F (36.9 °C)   Temp Source: Temp src: Oral   BP: BP: (120-131)/(66-88) 131/78   Pulse: Heart Rate:  [77-91] 82   Respirations: Resp:  [16-18] 16   SPO2: SpO2:  [98 %] 98 %   O2 Amount (l/min):     O2 Devices            Physical Exam    Lungs clear to auscultation bilaterally   Abdomen Soft, non-tender, normal bowel sounds; no bruits, organomegaly or masses.   Incision  healing well, no drainage, no erythema, no hernia, no seroma, no swelling, well approximated   Extremities extremities normal, atraumatic, no cyanosis or edema     LABS:  Lab Results   Component Value Date    WBC 13.23 (H) 2021    HGB 8.1 (L) 2021    HCT 25.2 (L) 2021    MCV 75.9 (L) 2021     2021       Assessment/Plan        Twin pregnancy, twins dichorionic and diamniotic    Dichorionic diamniotic twin pregnancy      Assessment:    Shavonne Pimentel is Day 3  post-partum        Plan:  plan for discharge today.  Follow up in 3 days for BP check   Incentive spirometer encouraged      Shira Ricci CNM  2021  12:26 EST     Hydroxyzine Counseling: Patient advised that the medication is sedating and not to drive a car after taking this medication.  Patient informed of potential adverse effects including but not limited to dry mouth, urinary retention, and blurry vision.  The patient verbalized understanding of the proper use and possible adverse effects of hydroxyzine.  All of the patient's questions and concerns were addressed.

## 2021-12-06 LAB
BH BB BLOOD EXPIRATION DATE: NORMAL
BH BB BLOOD EXPIRATION DATE: NORMAL
BH BB BLOOD TYPE BARCODE: 5100
BH BB BLOOD TYPE BARCODE: 5100
BH BB DISPENSE STATUS: NORMAL
BH BB DISPENSE STATUS: NORMAL
BH BB PRODUCT CODE: NORMAL
BH BB PRODUCT CODE: NORMAL
BH BB UNIT NUMBER: NORMAL
BH BB UNIT NUMBER: NORMAL
CROSSMATCH INTERPRETATION: NORMAL
CROSSMATCH INTERPRETATION: NORMAL
UNIT  ABO: NORMAL
UNIT  ABO: NORMAL
UNIT  RH: NORMAL
UNIT  RH: NORMAL

## 2021-12-09 ENCOUNTER — APPOINTMENT (OUTPATIENT)
Dept: WOMENS IMAGING | Facility: HOSPITAL | Age: 21
End: 2021-12-09

## 2021-12-10 ENCOUNTER — APPOINTMENT (OUTPATIENT)
Dept: PREADMISSION TESTING | Facility: HOSPITAL | Age: 21
End: 2021-12-10

## 2022-01-14 ENCOUNTER — TRANSCRIBE ORDERS (OUTPATIENT)
Dept: PHYSICAL THERAPY | Facility: CLINIC | Age: 22
End: 2022-01-14

## 2022-01-14 DIAGNOSIS — M62.08 DIASTASIS RECTI: Primary | ICD-10-CM

## 2022-01-24 ENCOUNTER — TREATMENT (OUTPATIENT)
Dept: PHYSICAL THERAPY | Facility: CLINIC | Age: 22
End: 2022-01-24

## 2022-01-24 DIAGNOSIS — R10.9 ABDOMINAL PAIN IN FEMALE: ICD-10-CM

## 2022-01-24 DIAGNOSIS — N81.89 PELVIC FLOOR WEAKNESS IN FEMALE: ICD-10-CM

## 2022-01-24 DIAGNOSIS — M62.08 DIASTASIS RECTI: Primary | ICD-10-CM

## 2022-01-24 DIAGNOSIS — M62.89 PELVIC FLOOR DYSFUNCTION IN FEMALE: ICD-10-CM

## 2022-01-24 PROCEDURE — 97162 PT EVAL MOD COMPLEX 30 MIN: CPT

## 2022-01-24 NOTE — PROGRESS NOTES
Physical Therapy Initial Evaluation and Plan of Care    Patient: Shavonne Pimentel   : 2000  Diagnosis/ICD-10 Code:  No primary diagnosis found.  Referring practitioner: NATHALIA Sandoval  Date of Initial Visit: 2022  Today's Date: 2022  Patient seen for 1 sessions    [id number]  Concern after delivery pulsation .  Pain where incision.  Delivery Dec 2, 7-8 weeksPP  6week f/u everything good.  After eating      Chief Complaint:   Chief Complaint   Patient presents with   • Initial Evaluation      Functional Outcome Measure: PFDI-20 score: 30 %    Pain  Left  incision/abdominal area 3/10 up to 6/10  Sharp and    Bladder function:  Frequent urination sensation  Incontinence: none  Urination at night: 2-3x with babies, 1x to urinate  Use bathroom “just in case”: yes,   Strong urinary urge: yes  Leaking with urge: none    Complete bladder voiding %: yes  Urinary splaying: none  Post-micturition dribble: none  Frequency of urination: every 4 hours  Discomfort with urination: none  Fluid irritants consumed daily: 8-16 oz    Bowel function:    How many episodes of leakage/month: none  How many BM's/day: daily, multiple  Wheaton Stool Scale Type: 3-5  Discomfort with BM: baby pressure on belly prior,  Ability to pass gas: yes    Sexual activity  Sexually active: not in pregnancy, hx of dyspareunia.  Wait until period starts, hasn't attempted since delivery    Diagnostics:    PMH:   Past Medical History:   Diagnosis Date   • ADHD    • Anxiety    • Asthma    • Depression    • Diabetes mellitus (HCC)     gestational   • Gestational diabetes    • Migraine    • Multiple gestation    • Ovarian cyst    • Personal history of COVID-19 2021        Surgical HX:   Past Surgical History:   Procedure Laterality Date   •  SECTION N/A 2021    Procedure:  SECTION PRIMARY DI-DI TWINS;  Surgeon: Linda Leos MD;  Location: [facility] LABOR DELIVERY;  Service:  Obstetrics/Gynecology;  Laterality: N/A;   • TONSILLECTOMY     • WISDOM TOOTH EXTRACTION          Menstrual cycle: not since delivery    Birth Hx: 1st pregnancy, twins, ; gestational diabetes;35 weeks. 6# each.    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  To return [id number]    Activity level/exercise routine: went to gym for first time, TM, eliptical, cycling      Objective    Patient independently ambulates into clinic.     Verbal consent obtained for internal pelvic exam/treatment with declined need for second person in room  Posture:ant. pelvic tilt, increased lumbar lordosis, forward head, rounded shoulders  Bony Landmarks: level iliac crest, SI  Palpation: TTP L SI  Squat n/t  Seated LE MMT: 4+/5  Seated Hip ROM  R ER 25 IR 40  L ER 32 IR 32    Supine: Infrasternal Angle: 90  Breathing pattern:  shallow  Abdominals: L>R tenderness  FEDE:  2 FW-umbilicus,   2 FW 5cm above   1 FW 5cm below  decreased Tension   + Doming Noted  Iliacus : very tender,tense L>R  Psoas :  TTP L.R  Adductors not test        PELVIC FLOOR ASSESSMENT deferred until clearance has been received from CNM  See flowsheet for details of treatment following evaluation.    Basic information was provided regarding pelvic floor anatomy, explanation of the function of the pelvic floor, interaction between diaphragm and PFM. Patient was provided with a bladder diary to be completed and returned to next visit. Instruction began regarding fluid intake, micturition and defecation behavior, and use of squatty potty.    Assessment/Plan:     Assessment/Plan    The patient is a 21 y.o. female who presents to physical therapy today for postpartum changes with diastasis recti, abdominal  pain and urinary symptoms. Upon initial evaluation, the patient demonstrates the following impairments: Abdominal weakness/separation of the rectus abdominis, scar tissue restrictions spasm of the iliac us and psoas, pelvic floor dysfunction.. Due to these impairments, the patient is unable to move and exercise with freedom from pain complete daily activities with freedom from leakage. The patient would benefit from skilled pelvic PT services to address functional limitations and impairments and to improve patient quality of life.      Goals:   STG's: 4 weeks  · Patient will report > 50% reduction in overall pain   · Patient will report > 50% improvement in urinary symptoms  · Patient will be able to perform HEP with minimal verbal cues    LTG's: By discharge  · Patient will report a decrease in pain to <0/10  · Patient will report > 75% improvement in urinary symptoms   · Patient functional outcome measure test, PFDI-20 improved score by > 50%  · Patient will report an elimination of urinary urgency   · Patient will report an elimination of nocturia  · Patient will be able to tolerate an internal pelvic exam/vaginal penetration with pain <0/10    · Patient will decrease voiding frequency to once every 3-4 hours  · Patient will be independent with HEP      Plan  Therapy options: will be seen for skilled physical therapy services  Planned modality interventions: TENS, ultrasound, cryotherapy, thermotherapy (hydrocollator packs)  Planned therapy interventions: abdominal trunk stabilization, manual therapy, neuromuscular re-education, body mechanics training, flexibility, functional ROM exercises, gait training, home exercise program, joint mobilization, therapeutic activities, stretching, strengthening, spinal/joint mobilization, soft tissue mobilization and postural training  Pt prognosis: Good  Frequency:weekly  Duration in visits: 8  Duration in weeks: 12  Treatment plan discussed with: patient    Treatment Time:  10:43 - 11:15  Timed:         Manual Therapy:    0     mins  60019;     Therapeutic Exercise:    5     mins  82999;     Neuromuscular Cayetano:    0    mins  68128;    Therapeutic Activity:     0     mins  50593;     Gait Trainin     mins  40910;     Ultrasound:     0     mins  96949;    Ionto                               0    mins   91689  Self Care                       0     mins   74669  Canalith Repos    0     mins 94580      Un-Timed:  Electrical Stimulation:    0     mins  87894 ( );  Dry Needling     0     mins self-pay  Traction     0     mins 45168  Low Eval     0     Mins  10091  Mod Eval     27     Mins  22967  High Eval                       0     Mins  77299  Re-Eval                           0    mins  78173        Timed Treatment:   5   mins   Total Treatment:     32   mins    PT SIGNATURE:Grazyna Zhao PT  [place] License: [id number]    DATE TREATMENT INITIATED: 2022    Initial Certification  Certification Period: 2022  I certify that the therapy services are furnished while this patient is under my care.  The services outlined above are required by this patient, and will be reviewed every 90 days.     PHYSICIAN: Shira Ricci CNM      DATE: 2022     Please sign and return via fax to [phone]. Thank you, [facility].

## 2022-01-31 ENCOUNTER — TREATMENT (OUTPATIENT)
Dept: PHYSICAL THERAPY | Facility: CLINIC | Age: 22
End: 2022-01-31

## 2022-01-31 DIAGNOSIS — N81.89 PELVIC FLOOR WEAKNESS IN FEMALE: ICD-10-CM

## 2022-01-31 DIAGNOSIS — M62.89 PELVIC FLOOR DYSFUNCTION IN FEMALE: ICD-10-CM

## 2022-01-31 DIAGNOSIS — R10.9 ABDOMINAL PAIN IN FEMALE: ICD-10-CM

## 2022-01-31 DIAGNOSIS — M62.08 DIASTASIS RECTI: Primary | ICD-10-CM

## 2022-01-31 PROCEDURE — 97110 THERAPEUTIC EXERCISES: CPT

## 2022-01-31 PROCEDURE — 97140 MANUAL THERAPY 1/> REGIONS: CPT

## 2022-01-31 NOTE — PROGRESS NOTES
Physical Therapy Daily Treatment Note      Patient: Shavonne Pimentel   : 2000  Referring practitioner: MARIANNE Sandoval*  Date of Initial Visit: Type: THERAPY  Noted: 2022  Today's Date: 2022  Patient seen for 2 sessions       Visit Diagnoses:  No diagnosis found.    Subjective   Short, sharp pain L groin.  No leakage  SI discomfort at times      Objective   See Exercise, Manual, and Modality Logs for complete treatment.        Assessment/Plan   Tension in L psoas, iliacus and restrictions in  scar.  Cont POC, will address SI pain, muscle tension, progress pelvic brace and Abdominal strengthening.  May consider Serola stabilization belt if pain continues.  Will continue with pelvic floor assessment due to hx of dyspareunia      9:52-10:41  Timed:         Manual Therapy:    30     mins  97757;     Therapeutic Exercise:    19     mins  73988;     Neuromuscular Cayetano:    0    mins  96618;    Therapeutic Activity:     0     mins  36201;     Gait Trainin     mins  01683;     Ultrasound:     0     mins  75579;    Ionto                               0    mins   18739  Self Care                       0     mins   82536  Canalith Repos    0     mins 26607      Un-Timed:  Electrical Stimulation:    0     mins  17383 ( );  Dry Needling     0     mins self-pay  Traction     0     mins 89004      Timed Treatment:   49   mins   Total Treatment:     49   mins    KENNETH Da Silva License: [id number]

## 2022-02-07 ENCOUNTER — TREATMENT (OUTPATIENT)
Dept: PHYSICAL THERAPY | Facility: CLINIC | Age: 22
End: 2022-02-07

## 2022-02-07 DIAGNOSIS — M62.08 DIASTASIS RECTI: Primary | ICD-10-CM

## 2022-02-07 DIAGNOSIS — N81.89 PELVIC FLOOR WEAKNESS IN FEMALE: ICD-10-CM

## 2022-02-07 DIAGNOSIS — R10.9 ABDOMINAL PAIN IN FEMALE: ICD-10-CM

## 2022-02-07 DIAGNOSIS — M62.89 PELVIC FLOOR DYSFUNCTION IN FEMALE: ICD-10-CM

## 2022-02-07 PROCEDURE — 97110 THERAPEUTIC EXERCISES: CPT

## 2022-02-07 PROCEDURE — 97140 MANUAL THERAPY 1/> REGIONS: CPT

## 2022-02-07 NOTE — PROGRESS NOTES
Physical Therapy Daily Treatment Note      Patient: Shavonne Pimentel   : 2000  Referring practitioner: MARIANNE Sandoval*  Date of Initial Visit: Type: THERAPY  Noted: 2022  Today's Date: 2022  Patient seen for 3 sessions       Visit Diagnoses:    ICD-10-CM ICD-9-CM   1. Diastasis recti  M62.08 728.84   2. Pelvic floor dysfunction in female  M62.89 618.83   3. Pelvic floor weakness in female  N81.89 618.89   4. Abdominal pain in female  R10.9 789.00       Subjective   No pain in abdominal area.  SI joint pain, 5/10  Objective   See Exercise, Manual, and Modality Logs for complete treatment.    Trial of Serola SI stabilization belt. Patient doesn't feel the need at this time.    Assessment/Plan   After treatment, no SI pain.  No SI rotation noted.  Abdominal restrictions decreased after treatment.    1:20-2:05  Timed:         Manual Therapy:    30     mins  38589;     Therapeutic Exercise:    15     mins  43762;     Neuromuscular Cayetano:    0    mins  86961;    Therapeutic Activity:     0     mins  76480;     Gait Trainin     mins  51276;     Ultrasound:     0     mins  19083;    Ionto                               0    mins   13883  Self Care                       0     mins   53806  Canalith Repos    0     mins 08370      Un-Timed:  Electrical Stimulation:    0     mins  15561 ( );  Dry Needling     0     mins self-pay  Traction     0     mins 62290      Timed Treatment:   45   mins   Total Treatment:     45   mins    KENNETH Da Silva License: [id number]

## 2025-06-05 NOTE — PLAN OF CARE
Goal Outcome Evaluation:  Plan of Care Reviewed With: patient        Progress: no change       Pt had a headache w/ some pressure in her chest at midnight but those were her only symptoms. Vital signs were WNL and O2 sat was 98%. The laborist was called and a Troponin was drawn.  That result came back WNL.  Called the result to the laborist and he said just to watch her and let him know of any changes.  There were no more changes as of this time.     alert, in no acute distress, well developed, well nourished

## (undated) DEVICE — SUT MNCRYL 1/0 CT1 36IN Y947H BX/36

## (undated) DEVICE — SOL IRR NACL 0.9PCT BT 1000ML

## (undated) DEVICE — SUT MONOCRYL SZ 4 0 18IN PS1 Y682H BX/36

## (undated) DEVICE — TRY SPINE BLCK WHITACRE 25G 3X5IN

## (undated) DEVICE — SKIN AFFIX SURG ADHESIVE 72/CS 0.55ML: Brand: MEDLINE

## (undated) DEVICE — SUT PLAIN  3/0 CT1 27IN 842H

## (undated) DEVICE — SOL IRR H2O BTL 1000ML STRL

## (undated) DEVICE — GLV SURG BIOGEL LTX PF 6

## (undated) DEVICE — SUT VIC 2/0 CT1 27IN J339H BX/36

## (undated) DEVICE — MAT PREVALON MOBL TRANSFR AIR WO/PAD 39X80IN

## (undated) DEVICE — PK C/SECT 10

## (undated) DEVICE — COATED VICRYL  (POLYGLACTIN 910) SUTURE, VIOLET BRAIDED, STERILE, SYNTHETIC ABSORBABLE SUTURE: Brand: COATED VICRYL